# Patient Record
Sex: FEMALE | Race: WHITE | Employment: PART TIME | URBAN - METROPOLITAN AREA
[De-identification: names, ages, dates, MRNs, and addresses within clinical notes are randomized per-mention and may not be internally consistent; named-entity substitution may affect disease eponyms.]

---

## 2017-08-10 ENCOUNTER — OFFICE VISIT (OUTPATIENT)
Dept: INTERNAL MEDICINE CLINIC | Facility: CLINIC | Age: 52
End: 2017-08-10

## 2017-08-10 VITALS
BODY MASS INDEX: 45.08 KG/M2 | WEIGHT: 245 LBS | HEIGHT: 62 IN | DIASTOLIC BLOOD PRESSURE: 96 MMHG | OXYGEN SATURATION: 97 % | RESPIRATION RATE: 18 BRPM | SYSTOLIC BLOOD PRESSURE: 124 MMHG | HEART RATE: 79 BPM

## 2017-08-10 DIAGNOSIS — R79.89 LOW VITAMIN D LEVEL: ICD-10-CM

## 2017-08-10 DIAGNOSIS — Z12.31 VISIT FOR SCREENING MAMMOGRAM: ICD-10-CM

## 2017-08-10 DIAGNOSIS — I10 ESSENTIAL HYPERTENSION: ICD-10-CM

## 2017-08-10 DIAGNOSIS — Z00.00 ANNUAL PHYSICAL EXAM: Primary | ICD-10-CM

## 2017-08-10 DIAGNOSIS — Z71.6 TOBACCO ABUSE COUNSELING: ICD-10-CM

## 2017-08-10 DIAGNOSIS — G47.33 OBSTRUCTIVE SLEEP APNEA HYPOPNEA, SEVERE: ICD-10-CM

## 2017-08-10 DIAGNOSIS — E11.9 TYPE 2 DIABETES MELLITUS WITHOUT COMPLICATION, WITHOUT LONG-TERM CURRENT USE OF INSULIN (HCC): ICD-10-CM

## 2017-08-10 DIAGNOSIS — E78.1 HYPERTRIGLYCERIDEMIA: ICD-10-CM

## 2017-08-10 DIAGNOSIS — F32.89 OTHER DEPRESSION: ICD-10-CM

## 2017-08-10 DIAGNOSIS — E66.01 MORBID OBESITY WITH BMI OF 40.0-44.9, ADULT (HCC): ICD-10-CM

## 2017-08-10 DIAGNOSIS — E03.9 ACQUIRED HYPOTHYROIDISM: ICD-10-CM

## 2017-08-10 LAB
ALBUMIN SERPL BCP-MCNC: 4.5 G/DL (ref 3.5–4.8)
ALBUMIN/GLOB SERPL: 1.5 {RATIO} (ref 1–2)
ALP SERPL-CCNC: 61 U/L (ref 32–100)
ALT SERPL-CCNC: 45 U/L (ref 14–54)
ANION GAP SERPL CALC-SCNC: 12 MMOL/L (ref 0–18)
AST SERPL-CCNC: 41 U/L (ref 15–41)
BILIRUB SERPL-MCNC: 0.5 MG/DL (ref 0.3–1.2)
BUN SERPL-MCNC: 10 MG/DL (ref 8–20)
BUN/CREAT SERPL: 14.7 (ref 10–20)
CALCIUM SERPL-MCNC: 9.6 MG/DL (ref 8.5–10.5)
CHLORIDE SERPL-SCNC: 98 MMOL/L (ref 95–110)
CHOLEST SERPL-MCNC: 228 MG/DL (ref 110–200)
CO2 SERPL-SCNC: 28 MMOL/L (ref 22–32)
CREAT SERPL-MCNC: 0.68 MG/DL (ref 0.5–1.5)
GLOBULIN PLAS-MCNC: 3 G/DL (ref 2.5–3.7)
GLUCOSE SERPL-MCNC: 93 MG/DL (ref 70–99)
HDLC SERPL-MCNC: 43 MG/DL
LDLC SERPL CALC-MCNC: 135 MG/DL (ref 0–99)
NONHDLC SERPL-MCNC: 185 MG/DL
OSMOLALITY UR CALC.SUM OF ELEC: 285 MOSM/KG (ref 275–295)
POTASSIUM SERPL-SCNC: 4 MMOL/L (ref 3.3–5.1)
PROT SERPL-MCNC: 7.5 G/DL (ref 5.9–8.4)
SODIUM SERPL-SCNC: 138 MMOL/L (ref 136–144)
TRIGL SERPL-MCNC: 248 MG/DL (ref 1–149)
TSH SERPL-ACNC: 3.47 UIU/ML (ref 0.45–5.33)

## 2017-08-10 PROCEDURE — 99204 OFFICE O/P NEW MOD 45 MIN: CPT | Performed by: FAMILY MEDICINE

## 2017-08-10 PROCEDURE — 36415 COLL VENOUS BLD VENIPUNCTURE: CPT | Performed by: FAMILY MEDICINE

## 2017-08-10 PROCEDURE — 80061 LIPID PANEL: CPT | Performed by: FAMILY MEDICINE

## 2017-08-10 PROCEDURE — 83036 HEMOGLOBIN GLYCOSYLATED A1C: CPT | Performed by: FAMILY MEDICINE

## 2017-08-10 PROCEDURE — 84443 ASSAY THYROID STIM HORMONE: CPT | Performed by: FAMILY MEDICINE

## 2017-08-10 PROCEDURE — 80053 COMPREHEN METABOLIC PANEL: CPT | Performed by: FAMILY MEDICINE

## 2017-08-10 RX ORDER — HYDROCHLOROTHIAZIDE 50 MG/1
50 TABLET ORAL DAILY
COMMUNITY
End: 2017-08-10 | Stop reason: DRUGHIGH

## 2017-08-10 RX ORDER — BUPROPION HYDROCHLORIDE 150 MG/1
150 TABLET, EXTENDED RELEASE ORAL 2 TIMES DAILY
Qty: 180 TABLET | Refills: 0 | Status: SHIPPED | OUTPATIENT
Start: 2017-08-10 | End: 2017-12-05

## 2017-08-10 RX ORDER — ERGOCALCIFEROL 1.25 MG/1
50000 CAPSULE ORAL WEEKLY
COMMUNITY
End: 2017-08-10

## 2017-08-10 RX ORDER — LOSARTAN POTASSIUM 50 MG/1
50 TABLET ORAL DAILY
COMMUNITY
End: 2017-08-10 | Stop reason: DRUGHIGH

## 2017-08-10 RX ORDER — FENOFIBRATE 134 MG/1
134 CAPSULE ORAL
COMMUNITY
End: 2018-04-16

## 2017-08-10 RX ORDER — RIBOFLAVIN (VITAMIN B2) 100 MG
100 TABLET ORAL DAILY
COMMUNITY

## 2017-08-10 RX ORDER — ERGOCALCIFEROL 1.25 MG/1
50000 CAPSULE ORAL WEEKLY
Qty: 12 CAPSULE | Refills: 1 | Status: SHIPPED | OUTPATIENT
Start: 2017-08-10 | End: 2018-04-24

## 2017-08-10 RX ORDER — CHLORAL HYDRATE 500 MG
1000 CAPSULE ORAL DAILY
COMMUNITY

## 2017-08-10 RX ORDER — LEVOTHYROXINE SODIUM 0.05 MG/1
50 TABLET ORAL
COMMUNITY
End: 2017-09-13

## 2017-08-10 RX ORDER — METOPROLOL TARTRATE 50 MG/1
50 TABLET, FILM COATED ORAL 2 TIMES DAILY
COMMUNITY
End: 2018-04-16

## 2017-08-10 RX ORDER — LOSARTAN POTASSIUM AND HYDROCHLOROTHIAZIDE 25; 100 MG/1; MG/1
1 TABLET ORAL DAILY
Qty: 90 TABLET | Refills: 3 | Status: SHIPPED | OUTPATIENT
Start: 2017-08-10 | End: 2018-09-05

## 2017-08-10 RX ORDER — SIMVASTATIN 20 MG
20 TABLET ORAL NIGHTLY
COMMUNITY
End: 2017-08-11 | Stop reason: ALTCHOICE

## 2017-08-10 NOTE — PROGRESS NOTES
CC:  Physical (New pt presents to clinic for annual physical. )      Hx of CC:  BECOMING ESTABLISHED AND FOR GENERAL CHECK UP.     Vitals:    08/10/17  1302   BP: 124/96   Pulse: 79   Resp: 18   SpO2: 97%   Weight: 245 lb   Height: 62\"         Body mass in times daily. Dispense: 180 tablet; Refill: 0    9. Other depression    - BuPROPion HCl ER, SR, 150 MG Oral Tablet 12 Hr; Take 1 tablet (150 mg total) by mouth 2 (two) times daily. Dispense: 180 tablet; Refill: 0    10.  Low vitamin D level    - ergocalcif

## 2017-08-11 ENCOUNTER — MED REC SCAN ONLY (OUTPATIENT)
Dept: INTERNAL MEDICINE CLINIC | Facility: CLINIC | Age: 52
End: 2017-08-11

## 2017-08-11 DIAGNOSIS — E78.2 MIXED HYPERLIPIDEMIA: Primary | ICD-10-CM

## 2017-08-11 LAB — HBA1C MFR BLD: 6.2 % (ref 4–6)

## 2017-08-11 RX ORDER — ATORVASTATIN CALCIUM 20 MG/1
20 TABLET, FILM COATED ORAL NIGHTLY
Qty: 90 TABLET | Refills: 3 | Status: SHIPPED | OUTPATIENT
Start: 2017-08-11 | End: 2017-09-13

## 2017-09-13 DIAGNOSIS — E78.2 MIXED HYPERLIPIDEMIA: ICD-10-CM

## 2017-09-13 RX ORDER — ATORVASTATIN CALCIUM 20 MG/1
20 TABLET, FILM COATED ORAL NIGHTLY
Qty: 90 TABLET | Refills: 3 | Status: SHIPPED | OUTPATIENT
Start: 2017-09-13 | End: 2018-10-10

## 2017-09-13 RX ORDER — LEVOTHYROXINE SODIUM 0.05 MG/1
50 TABLET ORAL
Qty: 90 TABLET | Refills: 0 | Status: SHIPPED | OUTPATIENT
Start: 2017-09-13 | End: 2018-06-12

## 2017-09-29 ENCOUNTER — APPOINTMENT (OUTPATIENT)
Dept: GENERAL RADIOLOGY | Facility: HOSPITAL | Age: 52
DRG: 287 | End: 2017-09-29
Attending: EMERGENCY MEDICINE
Payer: MEDICAID

## 2017-09-29 ENCOUNTER — HOSPITAL ENCOUNTER (INPATIENT)
Facility: HOSPITAL | Age: 52
LOS: 3 days | Discharge: HOME | DRG: 287 | End: 2017-10-02
Attending: EMERGENCY MEDICINE | Admitting: HOSPITALIST
Payer: MEDICAID

## 2017-09-29 ENCOUNTER — HOSPITAL ENCOUNTER (OUTPATIENT)
Age: 52
Discharge: ANOTHER HOSPITAL | DRG: 287 | End: 2017-09-29
Attending: EMERGENCY MEDICINE
Payer: MEDICAID

## 2017-09-29 VITALS
HEIGHT: 62 IN | WEIGHT: 236 LBS | SYSTOLIC BLOOD PRESSURE: 138 MMHG | DIASTOLIC BLOOD PRESSURE: 86 MMHG | BODY MASS INDEX: 43.43 KG/M2 | TEMPERATURE: 98 F | HEART RATE: 59 BPM | OXYGEN SATURATION: 98 % | RESPIRATION RATE: 16 BRPM

## 2017-09-29 DIAGNOSIS — R07.9 ACUTE CHEST PAIN: Primary | ICD-10-CM

## 2017-09-29 PROCEDURE — 99223 1ST HOSP IP/OBS HIGH 75: CPT | Performed by: HOSPITALIST

## 2017-09-29 PROCEDURE — 93005 ELECTROCARDIOGRAM TRACING: CPT

## 2017-09-29 PROCEDURE — 93010 ELECTROCARDIOGRAM REPORT: CPT | Performed by: EMERGENCY MEDICINE

## 2017-09-29 PROCEDURE — 99214 OFFICE O/P EST MOD 30 MIN: CPT

## 2017-09-29 PROCEDURE — 93010 ELECTROCARDIOGRAM REPORT: CPT

## 2017-09-29 PROCEDURE — 71010 XR CHEST AP PORTABLE  (CPT=71010): CPT | Performed by: EMERGENCY MEDICINE

## 2017-09-29 RX ORDER — SODIUM PHOSPHATE, DIBASIC AND SODIUM PHOSPHATE, MONOBASIC 7; 19 G/133ML; G/133ML
1 ENEMA RECTAL ONCE AS NEEDED
Status: DISCONTINUED | OUTPATIENT
Start: 2017-09-29 | End: 2017-10-02

## 2017-09-29 RX ORDER — DEXTROSE MONOHYDRATE 25 G/50ML
50 INJECTION, SOLUTION INTRAVENOUS AS NEEDED
Status: DISCONTINUED | OUTPATIENT
Start: 2017-09-29 | End: 2017-10-02

## 2017-09-29 RX ORDER — ACETAMINOPHEN 325 MG/1
650 TABLET ORAL EVERY 6 HOURS PRN
Status: DISCONTINUED | OUTPATIENT
Start: 2017-09-29 | End: 2017-10-02

## 2017-09-29 RX ORDER — NITROGLYCERIN 0.4 MG/1
0.4 TABLET SUBLINGUAL EVERY 5 MIN PRN
Status: DISCONTINUED | OUTPATIENT
Start: 2017-09-29 | End: 2017-10-02

## 2017-09-29 RX ORDER — ASPIRIN 325 MG
325 TABLET ORAL DAILY
Status: DISCONTINUED | OUTPATIENT
Start: 2017-09-29 | End: 2017-10-01

## 2017-09-29 RX ORDER — MORPHINE SULFATE 2 MG/ML
2 INJECTION, SOLUTION INTRAMUSCULAR; INTRAVENOUS EVERY 2 HOUR PRN
Status: DISCONTINUED | OUTPATIENT
Start: 2017-09-29 | End: 2017-10-02

## 2017-09-29 RX ORDER — MORPHINE SULFATE 4 MG/ML
4 INJECTION, SOLUTION INTRAMUSCULAR; INTRAVENOUS EVERY 2 HOUR PRN
Status: DISCONTINUED | OUTPATIENT
Start: 2017-09-29 | End: 2017-10-02

## 2017-09-29 RX ORDER — NITROGLYCERIN 0.4 MG/1
TABLET SUBLINGUAL
Status: COMPLETED
Start: 2017-09-29 | End: 2017-09-29

## 2017-09-29 RX ORDER — MORPHINE SULFATE 2 MG/ML
1 INJECTION, SOLUTION INTRAMUSCULAR; INTRAVENOUS EVERY 2 HOUR PRN
Status: DISCONTINUED | OUTPATIENT
Start: 2017-09-29 | End: 2017-10-02

## 2017-09-29 RX ORDER — NITROGLYCERIN 0.4 MG/1
0.4 TABLET SUBLINGUAL ONCE
Status: COMPLETED | OUTPATIENT
Start: 2017-09-29 | End: 2017-09-29

## 2017-09-29 RX ORDER — ATORVASTATIN CALCIUM 40 MG/1
80 TABLET, FILM COATED ORAL NIGHTLY
Status: DISCONTINUED | OUTPATIENT
Start: 2017-09-29 | End: 2017-10-02

## 2017-09-29 RX ORDER — NITROGLYCERIN 0.4 MG/1
0.4 TABLET SUBLINGUAL EVERY 5 MIN PRN
Status: DISCONTINUED | OUTPATIENT
Start: 2017-09-29 | End: 2017-09-30

## 2017-09-29 RX ORDER — BISACODYL 10 MG
10 SUPPOSITORY, RECTAL RECTAL
Status: DISCONTINUED | OUTPATIENT
Start: 2017-09-29 | End: 2017-10-02

## 2017-09-29 RX ORDER — ENOXAPARIN SODIUM 100 MG/ML
40 INJECTION SUBCUTANEOUS DAILY
Status: DISCONTINUED | OUTPATIENT
Start: 2017-09-29 | End: 2017-10-02

## 2017-09-29 RX ORDER — ONDANSETRON 2 MG/ML
4 INJECTION INTRAMUSCULAR; INTRAVENOUS EVERY 6 HOURS PRN
Status: DISCONTINUED | OUTPATIENT
Start: 2017-09-29 | End: 2017-10-02

## 2017-09-29 RX ORDER — DOCUSATE SODIUM 100 MG/1
100 CAPSULE, LIQUID FILLED ORAL 2 TIMES DAILY
Status: DISCONTINUED | OUTPATIENT
Start: 2017-09-29 | End: 2017-10-02

## 2017-09-29 RX ORDER — POLYETHYLENE GLYCOL 3350 17 G/17G
17 POWDER, FOR SOLUTION ORAL DAILY PRN
Status: DISCONTINUED | OUTPATIENT
Start: 2017-09-29 | End: 2017-10-02

## 2017-09-29 RX ORDER — ASPIRIN 81 MG/1
324 TABLET, CHEWABLE ORAL ONCE
Status: COMPLETED | OUTPATIENT
Start: 2017-09-29 | End: 2017-09-29

## 2017-09-29 RX ORDER — METOPROLOL SUCCINATE 25 MG/1
25 TABLET, EXTENDED RELEASE ORAL
Status: DISCONTINUED | OUTPATIENT
Start: 2017-09-30 | End: 2017-10-02

## 2017-09-29 RX ORDER — SODIUM CHLORIDE 0.9 % (FLUSH) 0.9 %
3 SYRINGE (ML) INJECTION AS NEEDED
Status: DISCONTINUED | OUTPATIENT
Start: 2017-09-29 | End: 2017-10-02

## 2017-09-29 NOTE — ED PROVIDER NOTES
Patient Seen in: [facility] Immediate Care In [address] [place] [address]    History   Patient presents with:  Numbness Weakness (neurologic)    Stated Complaint: Arm Numbness/Leg Numbness    HPI    50-year-old female with history of tobacco abuse, morbid obesity, h 157.5 cm (5' 2\")   Wt 107 kg   LMP 08/10/2015   SpO2 98%   BMI 43.16 kg/m²         Physical Exam   Constitutional: She is oriented to person, place, and time. She appears well-developed and well-nourished. No distress.    HENT:   Head: Normocephalic and at

## 2017-09-29 NOTE — H&P
[facility]    PATIENT'S NAME: PRASHANTH EARLDaja TERRY [facility], [address] PHYSICIAN: Yelitza Higginbotham MD   PATIENT ACCOUNT#:   [id number]    LOCATION:  [place] [address]  MEDICAL RECORD #:   [id number]       YOB: 1965  ADMISSION DATE:       09/29/20 PHYSICAL EXAMINATION:    GENERAL:  Alert. Oriented to time, place and person. No acute distress. VITAL SIGNS:  Temperature 98.0, pulse 57, respiratory rate 19, blood pressure 122/71, pulse ox 96% on room air. HEENT:  Atraumatic.   Oropharynx clear w

## 2017-09-29 NOTE — ED INITIAL ASSESSMENT (HPI)
Pt sent from [facility] after EKG, pt had numbness to upper arms and both lower legs this morning, fatigue and slight headache denies NVD, had right sided chest pain and right ear pressure and head

## 2017-09-29 NOTE — ED PROVIDER NOTES
Patient Seen in: [facility] Emergency Department    History   Patient presents with:  Chest Pain Angina (cardiovascular)    Stated Complaint: chest pain, arm numbness    HPI    Patient presents with discomfort in the right side of the chest starti Hypertension Father        Smoking status: Current Every Day Smoker                                                   Packs/day: 0.50      Years: 0.00      Smokeless tobacco: Never Used                      Alcohol use:  Yes               Review of Systems well.    I recommended admission we will do blood tests EKG chest x-ray do trial of sublingual nitroglycerin    She did feel better with nitroglycerin still having some discomfort workup to this point is negative.   She does have Q waves on EKG with her his follow-up provider specified.     Medications Prescribed:  Current Discharge Medication List        Present on Admission           ICD-10-CM Noted POA    Acute chest pain R07.9 9/29/2017 Unknown

## 2017-09-29 NOTE — ED INITIAL ASSESSMENT (HPI)
Presents with bilateral arm and leg numbness and numbness to right side of chest. Also reports feeling of \"ears full\", chest pain, and SOB. Symptoms began this morning while sitting at table having breakfast. Also reports bilateral weakness.  Pt ambulated

## 2017-09-29 NOTE — PLAN OF CARE
Diabetes/Glucose Control    • Glucose maintained within prescribed range Progressing    Glucose within prescribed range     Patient/Family Goals    • Patient/Family Long Term Goal Progressing    • Patient/Family Short Term Goal Progressing    Discussed

## 2017-09-30 ENCOUNTER — APPOINTMENT (OUTPATIENT)
Dept: ULTRASOUND IMAGING | Facility: HOSPITAL | Age: 52
DRG: 287 | End: 2017-09-30
Attending: HOSPITALIST
Payer: MEDICAID

## 2017-09-30 ENCOUNTER — APPOINTMENT (OUTPATIENT)
Dept: CV DIAGNOSTICS | Facility: HOSPITAL | Age: 52
DRG: 287 | End: 2017-09-30
Attending: HOSPITALIST
Payer: MEDICAID

## 2017-09-30 PROCEDURE — 93306 TTE W/DOPPLER COMPLETE: CPT | Performed by: HOSPITALIST

## 2017-09-30 PROCEDURE — 99233 SBSQ HOSP IP/OBS HIGH 50: CPT | Performed by: HOSPITALIST

## 2017-09-30 PROCEDURE — 93971 EXTREMITY STUDY: CPT | Performed by: HOSPITALIST

## 2017-09-30 RX ORDER — ISOSORBIDE MONONITRATE 30 MG/1
30 TABLET, EXTENDED RELEASE ORAL DAILY
Status: DISCONTINUED | OUTPATIENT
Start: 2017-09-30 | End: 2017-10-02

## 2017-09-30 RX ORDER — GABAPENTIN 100 MG/1
100 CAPSULE ORAL NIGHTLY
Status: DISCONTINUED | OUTPATIENT
Start: 2017-09-30 | End: 2017-10-02

## 2017-09-30 NOTE — PROGRESS NOTES
[place] [facility]  Hospitalist Progress  Note     Radha Quiñonez Patient Status:  Inpatient    1965  47year old CSN [id number]   Location 338/338-A Attending Reynold Farah, [address] [facility] Day # 1 PCP Babita Fong, DO     ASSESSMENT/PLAN    Yadira Macdonald 4.3   CL  101  102   CO2  25  30     No results for input(s): PT, INR, PTT in the last 72 hours.     • Isosorbide Mononitrate ER  30 mg Oral Daily   • enoxaparin  40 mg Subcutaneous Daily   • docusate sodium  100 mg Oral BID   • Insulin Aspart Pen  1-7 Unit

## 2017-09-30 NOTE — PROGRESS NOTES
Patient seen in follow up. No overnight events.    Still feels slight bilateral arm numbness but chest heaviness has improved with nitro paste   Vitals reviewed and blood pressure stable   Troponin negative x2        09/30/17  0845   BP: 108/66   Puls Glucose-Vitamin C (DEX-4) 4-0.006 g chewable tab 4 tablet 4 tablet Oral Q15 Min PRN   Insulin Aspart Pen (NOVOLOG) 100 UNIT/ML flexpen 1-7 Units 1-7 Units Subcutaneous TID CC   aspirin tab 325 mg 325 mg Oral Daily   nitroGLYCERIN (NITROSTAT) SL tab 0.4 mg 1. Acute chest pain likely consistent with angina   - ECG normal sinus rhythm, inferior infarct   - Tropoinin negative x2. - On asa, BB, statin, nitro paste. Feeling better     2. HTN   3. Dyslipidemia   4. DM II  5. Tobacco user      Plan   1.  Started as

## 2017-10-01 ENCOUNTER — APPOINTMENT (OUTPATIENT)
Dept: GENERAL RADIOLOGY | Facility: HOSPITAL | Age: 52
DRG: 287 | End: 2017-10-01
Attending: HOSPITALIST
Payer: MEDICAID

## 2017-10-01 PROCEDURE — 73502 X-RAY EXAM HIP UNI 2-3 VIEWS: CPT | Performed by: HOSPITALIST

## 2017-10-01 PROCEDURE — 99233 SBSQ HOSP IP/OBS HIGH 50: CPT | Performed by: HOSPITALIST

## 2017-10-01 RX ORDER — SODIUM CHLORIDE 9 MG/ML
INJECTION, SOLUTION INTRAVENOUS CONTINUOUS
Status: DISCONTINUED | OUTPATIENT
Start: 2017-10-01 | End: 2017-10-01

## 2017-10-01 RX ORDER — MORPHINE SULFATE 2 MG/ML
1 INJECTION, SOLUTION INTRAMUSCULAR; INTRAVENOUS EVERY 4 HOURS PRN
Status: DISCONTINUED | OUTPATIENT
Start: 2017-10-01 | End: 2017-10-02

## 2017-10-01 RX ORDER — ASPIRIN 81 MG/1
324 TABLET, CHEWABLE ORAL ONCE
Status: COMPLETED | OUTPATIENT
Start: 2017-10-02 | End: 2017-10-02

## 2017-10-01 RX ORDER — MORPHINE SULFATE 2 MG/ML
1 INJECTION, SOLUTION INTRAMUSCULAR; INTRAVENOUS ONCE
Status: COMPLETED | OUTPATIENT
Start: 2017-10-01 | End: 2017-10-01

## 2017-10-01 RX ORDER — SODIUM CHLORIDE 9 MG/ML
INJECTION, SOLUTION INTRAVENOUS CONTINUOUS
Status: DISCONTINUED | OUTPATIENT
Start: 2017-10-02 | End: 2017-10-02

## 2017-10-01 RX ORDER — ASPIRIN 325 MG
325 TABLET ORAL DAILY
Status: DISCONTINUED | OUTPATIENT
Start: 2017-10-03 | End: 2017-10-02

## 2017-10-01 NOTE — PROGRESS NOTES
Pt complained of left lower extremity pain, notified Caitlin Kelly ordered venous doppler US, radiologist  called with the result, he said pt had a negative lower extremity.

## 2017-10-01 NOTE — PROGRESS NOTES
[place] [facility]  Hospitalist Progress  Note     [facility] Patient Status:  Inpatient    1965  47year old CSN [id number]   Location 338/338-A Attending Diann Dixon, [address] [facility] [address] Day # 2 PCP Girish Gustafson DO     ASSESSMENT/PLAN    [address] erythema, diaphoresis. Psych: Normal mood and affect. Behavior and judgment normal.     Labs:  Recent Labs   Lab  09/29/17   1343  09/30/17   0647   RBC  4.64  4.42   HGB  12.9  12.4   HCT  38.8  37.2   MCV  83.5  84.1   MCH  27.7  28.0   MCHC  33.2  33.

## 2017-10-01 NOTE — BH PROGRESS NOTE
S/O  Patient c/o severe pain radiating from L upper thigh to knee. It comes and goes. She has never had any similar pain in the past.  Tried gabapentin for the possibility of neuropathy.   Checked lower extremity venous doppler which was reportedly negati

## 2017-10-01 NOTE — PROGRESS NOTES
Patient seen in follow up. She had some left hip pain that radiated down her leg.  She had xray showing some degenerative spine changes that might have caused her pain   Vitals reviewed and blood pressure stable   Labs reviewed      10/01/17  0802 FLEET ENEMA (FLEET) 7-19 GM/118ML enema 133 mL 1 enema Rectal Once PRN   dextrose 50% injection 50 mL 50 mL Intravenous PRN   Glucose-Vitamin C (DEX-4) 4-0.006 g chewable tab 4 tablet 4 tablet Oral Q15 Min PRN   Insulin Aspart Pen (NOVOLOG) 100 UNIT/ML fle CONCLUSION: No acute disease. No significant characteristic degenerative changes at the hip. Bony pelvis intact.     Degenerative changes at the lower lumbar spine may reflect chronic degenerative disc disease with referred pain to the left groin and hip

## 2017-10-02 ENCOUNTER — APPOINTMENT (OUTPATIENT)
Dept: INTERVENTIONAL RADIOLOGY/VASCULAR | Facility: HOSPITAL | Age: 52
DRG: 287 | End: 2017-10-02
Attending: INTERNAL MEDICINE
Payer: MEDICAID

## 2017-10-02 ENCOUNTER — TELEPHONE (OUTPATIENT)
Dept: INTERNAL MEDICINE CLINIC | Facility: CLINIC | Age: 52
End: 2017-10-02

## 2017-10-02 VITALS
DIASTOLIC BLOOD PRESSURE: 66 MMHG | BODY MASS INDEX: 46.17 KG/M2 | TEMPERATURE: 98 F | OXYGEN SATURATION: 98 % | WEIGHT: 250.88 LBS | SYSTOLIC BLOOD PRESSURE: 126 MMHG | HEIGHT: 62 IN | HEART RATE: 57 BPM | RESPIRATION RATE: 18 BRPM

## 2017-10-02 PROCEDURE — B2111ZZ FLUOROSCOPY OF MULTIPLE CORONARY ARTERIES USING LOW OSMOLAR CONTRAST: ICD-10-PCS | Performed by: INTERNAL MEDICINE

## 2017-10-02 PROCEDURE — 99239 HOSP IP/OBS DSCHRG MGMT >30: CPT | Performed by: HOSPITALIST

## 2017-10-02 PROCEDURE — 4A023N7 MEASUREMENT OF CARDIAC SAMPLING AND PRESSURE, LEFT HEART, PERCUTANEOUS APPROACH: ICD-10-PCS | Performed by: INTERNAL MEDICINE

## 2017-10-02 RX ORDER — MIDAZOLAM HYDROCHLORIDE 1 MG/ML
INJECTION INTRAMUSCULAR; INTRAVENOUS
Status: COMPLETED
Start: 2017-10-02 | End: 2017-10-02

## 2017-10-02 RX ORDER — HEPARIN SODIUM 1000 [USP'U]/ML
INJECTION, SOLUTION INTRAVENOUS; SUBCUTANEOUS
Status: COMPLETED
Start: 2017-10-02 | End: 2017-10-02

## 2017-10-02 RX ORDER — SODIUM CHLORIDE 9 MG/ML
INJECTION, SOLUTION INTRAVENOUS CONTINUOUS
Status: DISCONTINUED | OUTPATIENT
Start: 2017-10-02 | End: 2017-10-02

## 2017-10-02 RX ORDER — LIDOCAINE HYDROCHLORIDE 20 MG/ML
INJECTION, SOLUTION EPIDURAL; INFILTRATION; INTRACAUDAL; PERINEURAL
Status: COMPLETED
Start: 2017-10-02 | End: 2017-10-02

## 2017-10-02 RX ORDER — VERAPAMIL HYDROCHLORIDE 2.5 MG/ML
INJECTION, SOLUTION INTRAVENOUS
Status: DISCONTINUED
Start: 2017-10-02 | End: 2017-10-02 | Stop reason: WASHOUT

## 2017-10-02 RX ORDER — ENOXAPARIN SODIUM 100 MG/ML
30 INJECTION SUBCUTANEOUS EVERY 12 HOURS SCHEDULED
Status: DISCONTINUED | OUTPATIENT
Start: 2017-10-02 | End: 2017-10-02

## 2017-10-02 RX ORDER — NITROGLYCERIN 20 MG/100ML
INJECTION INTRAVENOUS
Status: COMPLETED
Start: 2017-10-02 | End: 2017-10-02

## 2017-10-02 NOTE — OPERATIVE REPORT
[facility]    PATIENT'S NAME: PRASHANTH STEWART [facility]   ATTENDING PHYSICIAN: Rex Maritnes MD   OPERATING PHYSICIAN: Nicki Severin, MD   PATIENT ACCOUNT#:   [id number]    LOCATION:  [address] [place] [address] RECORD #:   [id number]       DATE OF BIRTH:  [birth year] cusp in the usual fashion, it is a dominant artery, and showed mild diffuse disease. IMPRESSION:  Mild coronary artery disease, as described above. RECOMMENDATION:  Patient to maximize medical therapy for coronary artery disease.     Dictated By Tania Vidales

## 2017-10-02 NOTE — RESPIRATORY THERAPY NOTE
RESPIRATORY THERAPY CPAP PROGRESS NOTE:    Patient is compliant with nightly CPAP: YES  Patient refused: NO  AutoCPAP in use: YES  Peak EPAP noted:  [id number]  CPAP interface: NASAL PILLOWS  Patient tolerating: WELL    RCP recommends:  CONTINUE TO MONITOR TH

## 2017-10-02 NOTE — PROGRESS NOTES
Patient seen in follow up.    Vitals reviewed   Labs reviewed      10/02/17  0984   BP:    Pulse: 56   Resp: 18   Temp:        Intake/Output Summary (Last 24 hours) at 10/02/17 1139  Last data filed at 10/02/17 0736   Gross per 24 hour   Intake dextrose 50% injection 50 mL 50 mL Intravenous PRN   Glucose-Vitamin C (DEX-4) 4-0.006 g chewable tab 4 tablet 4 tablet Oral Q15 Min PRN   Insulin Aspart Pen (NOVOLOG) 100 UNIT/ML flexpen 1-7 Units 1-7 Units Subcutaneous TID CC   nitroGLYCERIN (NITROSTAT) - ECG normal sinus rhythm, inferior infarct   - Tropoinin negative x4   - Echo EF 65%  -  Cath showed mild CAD disease  - On asa, BB, statin, nitrates      2. HTN   3. Dyslipidemia   4. DM II  5. Tobacco user      Plan   1.  Continue ASA, BB, statin, and st

## 2017-10-02 NOTE — PLAN OF CARE
CARDIOVASCULAR - ADULT    • Maintains optimal cardiac output and hemodynamic stability Completed    • Absence of cardiac arrhythmias or at baseline Completed          Diabetes/Glucose Control    • Glucose maintained within prescribed range Completed

## 2017-10-02 NOTE — DISCHARGE SUMMARY
[facility] HOSPITALIST  DISCHARGE SUMMARY     Rodriguez Nicolas Patient Status:  Inpatient    1965 MRN [id number]   Location [facility] 3W/SW Attending Preeti Cobos, [address] [facility] [address] Day # 3 PCP Dominga Chandler,      DATE OF ADMISSION: 2017  DATE OF primary care physician whether imaging of her neck is appropriate. PHYSICAL EXAM:  Temp:  [97.9 °F (36.6 °C)-98.4 °F (36.9 °C)] 97.9 °F (36.6 °C)  Pulse:  [50-68] 57  Resp:  [15-20] 18  BP: (105-147)/(54-87) 126/66  Gen: A+Ox3. No distress.    HEENT: NC HYZAAR      Take 1 tablet by mouth daily. Quantity:  90 tablet  Refills:  3     MetFORMIN HCl 500 MG Tabs  Commonly known as:  GLUCOPHAGE      Take 500 mg by mouth 2 (two) times daily with meals.    Refills:  0     Metoprolol Tartrate 50 MG Tabs  Commonly

## 2017-10-02 NOTE — PROGRESS NOTES
Renata Fillers was prescribed enoxaparin (Lovenox) 40 mg subcutaneously every 24 hours. BMI: Body mass index is 45.89 kg/m². Estimated Creatinine Clearance: 72.5 mL/min (based on SCr of 0.71 mg/dL).     Per protocol, based on these patient-specific fa

## 2017-10-02 NOTE — BRIEF PROCEDURE NOTE
Preop diagnosis: Angina  Post op diagnosis: Mild CAD  Procedures: LH, COR  Findings: Mild CAD, LVEDP 29  EBL: 20 mls  Specimens: None    Juan C Caballero MD

## 2017-10-03 ENCOUNTER — TELEPHONE (OUTPATIENT)
Dept: MEDSURG UNIT | Facility: HOSPITAL | Age: 52
End: 2017-10-03

## 2017-10-03 DIAGNOSIS — Z09 HOSPITAL DISCHARGE FOLLOW-UP: Primary | ICD-10-CM

## 2017-10-03 NOTE — PAYOR COMM NOTE
--------------  ADMISSION REVIEW     Payor: Jose Shepherd #:  [id number]  Authorization Number: N/A    Admit date: 9/29/17  Admit time: 26       Admitting Physician: Preethi Snider MD  Attending Physician:  No at fenofibrate micronized 134 MG Oral Cap,  Take 134 mg by mouth daily with breakfast.   Metoprolol Tartrate 50 MG Oral Tab,  Take 50 mg by mouth 2 (two) times daily. MetFORMIN HCl 500 MG Oral Tab,  Take 500 mg by mouth 2 (two) times daily with meals.    om Constitutional:  Alert, well nourished adult lying in bed in no distress. Vital signs noted. Eye:  No scleral icterus. Eyelids appear normal, no lesions. Cardiovascular:  Normal S1 and S2, no murmur, regular, with good peripheral perfusion.   Patient ha CBC W/ DIFFERENTIAL[[id number]]          Normal              Final result                 Please view results for these tests on the individual orders.    RAINBOW DRAW BLUE   RAINBOW DRAW LAVENDER   RAINBOW DRAW DARK GREEN   RAINBOW DRAW LIGHT GREEN   Carmen. Vidhya Abraham 135 ADMISSION DATE:       09/29/2017    HISTORY AND PHYSICAL EXAMINATION    CHIEF COMPLAINT:  Angina pectoris.     HISTORY OF PRESENT ILLNESS:  The patient is a 43-year-old  female who started having bilateral upper extremity dull sensation and pressur HEENT:  Atraumatic. Oropharynx clear with moist mucous membranes. Normal hard and soft palate. NECK:  Trachea midline. Full range of motion. Supple. No thyromegaly or lymphadenopathy. LUNGS:  Clear to auscultation bilaterally.   Normal respiratory ef Author: Florecita Stalh DO Service: (none) Author Type: Physician   Filed: 9/30/2017 11:52 AM Date of Service: 9/29/2017  4:12 PM Status: Signed   : Florecita Stahl DO (Physician)   []Manual[]Template  []Copied       Patient is a 47year old fema All other systems reviewed and negative.     Physical Exam:         General: No acute distress. HEENT: No scleral icterus. No xanthelasma. Neck: No JVD, no bruits. Cardiac: Regular rate and rhythm.  S1, S2 normal.   Lungs: Clear without rhales, rhochi o  1965  47year old CSN [id number]   Location 338/338-A Attending Preeti Cobos MD   Hosp Day # 1 PCP Dominga Chandler, DO      ASSESSMENT/PLAN     Unstable angina. Troponins are negative. Still occasional discomfort.   Cardiology consultation appreciate No results for input(s): PT, INR, PTT in the last 72 hours.     • Isosorbide Mononitrate ER  30 mg Oral Daily   • enoxaparin  40 mg Subcutaneous Daily   • docusate sodium  100 mg Oral BID   • Insulin Aspart Pen  1-7 Units Subcutaneous TID CC   • aspirin  3 Left thigh pain. Occurred on the evening of 9/30/17. Located mostly on the left inner thigh radiating past the knee. Does not originate in the hip or the back.   No previous issues with this in the past.  Patient feels that she may have been laying aroun GFRNAA  >60  >60   CA  9.7  9.1   NA  137  137   K  3.6  4.3   CL  101  102   CO2  25  30      No results for input(s): PT, INR, PTT in the last 72 hours.     • Isosorbide Mononitrate ER  30 mg Oral Daily   • gabapentin  100 mg Oral Nightly   • enoxaparin Abdomen: Soft. Extremities: No edema. Neurological: Alert.   Psychiatric: Appropriate mood and affect.     Current Facility-Administered Medications:  morphINE sulfate (PF) 2 MG/ML injection 1 mg 1 mg Intravenous Q4H PRN   [START ON 10/3/2017] aspirin tab Levothyroxine Sodium 50 MCG Oral Tab Take 1 tablet (50 mcg total) by mouth before breakfast.   fenofibrate micronized 134 MG Oral Cap Take 134 mg by mouth daily with breakfast.   Metoprolol Tartrate 50 MG Oral Tab Take 50 mg by mouth 2 (two) times daily. Description: 47year old F Primary Service: Cardiac Telemetry Unit Info: [address] [place] [address] 3-W/SW   Discharge Summary Notes     Discharge Summaries signed by Reynold Farah MD at 10/2/2017  2:55 PM     Author: Reynold Farah MD Service: Hospitalist Author Type: Denice Dill Patient monitored in telemetry. Cardiology was consulted. Cardiac catheterization was recommended. Troponins remain negative. No further chest pain. Cardiac catheterization showed nonobstructive coronary disease that was mild.   Patient will be continu Take 1 tablet (20 mg total) by mouth nightly. Quantity:  90 tablet  Refills:  3   BuPROPion HCl ER (SR) 150 MG Tb12  Commonly known as:  WELLBUTRIN SR    Take 1 tablet (150 mg total) by mouth 2 (two) times daily.  Quantity:  180 tablet  Refills:  0   ergo The above plan and follow-up instructions were reviewed with the patient and they verbalized understanding and agreement. They understand to return to the emergency room for any concerning signs or symptoms. Greater than 30 minutes spent on discharge.

## 2017-10-16 ENCOUNTER — OFFICE VISIT (OUTPATIENT)
Dept: INTERNAL MEDICINE CLINIC | Facility: CLINIC | Age: 52
End: 2017-10-16

## 2017-10-16 VITALS
DIASTOLIC BLOOD PRESSURE: 90 MMHG | BODY MASS INDEX: 45.08 KG/M2 | WEIGHT: 245 LBS | SYSTOLIC BLOOD PRESSURE: 132 MMHG | RESPIRATION RATE: 19 BRPM | OXYGEN SATURATION: 98 % | HEIGHT: 62 IN | HEART RATE: 64 BPM

## 2017-10-16 DIAGNOSIS — E11.9 TYPE 2 DIABETES MELLITUS WITHOUT COMPLICATION, WITHOUT LONG-TERM CURRENT USE OF INSULIN (HCC): ICD-10-CM

## 2017-10-16 DIAGNOSIS — Z71.6 TOBACCO ABUSE COUNSELING: ICD-10-CM

## 2017-10-16 DIAGNOSIS — Z23 NEED FOR INFLUENZA VACCINATION: ICD-10-CM

## 2017-10-16 DIAGNOSIS — E66.01 MORBID OBESITY WITH BMI OF 40.0-44.9, ADULT (HCC): Primary | ICD-10-CM

## 2017-10-16 DIAGNOSIS — M17.11 ARTHRITIS OF RIGHT KNEE: ICD-10-CM

## 2017-10-16 DIAGNOSIS — M47.816 OSTEOARTHRITIS OF LUMBAR SPINE, UNSPECIFIED SPINAL OSTEOARTHRITIS COMPLICATION STATUS: ICD-10-CM

## 2017-10-16 DIAGNOSIS — Z09 HOSPITAL DISCHARGE FOLLOW-UP: ICD-10-CM

## 2017-10-16 PROCEDURE — 90471 IMMUNIZATION ADMIN: CPT | Performed by: FAMILY MEDICINE

## 2017-10-16 PROCEDURE — 90686 IIV4 VACC NO PRSV 0.5 ML IM: CPT | Performed by: FAMILY MEDICINE

## 2017-10-16 PROCEDURE — 99214 OFFICE O/P EST MOD 30 MIN: CPT | Performed by: FAMILY MEDICINE

## 2017-10-17 NOTE — PROGRESS NOTES
CC:  Hospital F/U (Pt presents to clinic for f/up hospital stay for chest pain and arm numbness. All testing normal.)      Hx of CC:  POST HOSPITALIZATION FOR CHEST PAIN--VARIOUS STUDIES \"NORMAL\". PATIENT C/O GRADUALLY WORSENING LOW BACK AND KNEE PAINS. Hospital discharge follow-up  REVIEWED ALL HOSPITAL WORKUP    FLULAVAL INFLUENZA VACCINE QUAD PRESERVATIVE FREE 0.5 ML  BARIATRICS - INTERNAL  No orders of the defined types were placed in this encounter.

## 2017-10-24 ENCOUNTER — TELEPHONE (OUTPATIENT)
Dept: INTERNAL MEDICINE CLINIC | Facility: CLINIC | Age: 52
End: 2017-10-24

## 2017-10-27 ENCOUNTER — OFFICE VISIT (OUTPATIENT)
Dept: INTERNAL MEDICINE CLINIC | Facility: CLINIC | Age: 52
End: 2017-10-27

## 2017-10-27 VITALS
HEART RATE: 74 BPM | HEIGHT: 62 IN | TEMPERATURE: 99 F | SYSTOLIC BLOOD PRESSURE: 126 MMHG | DIASTOLIC BLOOD PRESSURE: 84 MMHG | OXYGEN SATURATION: 98 % | BODY MASS INDEX: 46.52 KG/M2 | WEIGHT: 252.81 LBS

## 2017-10-27 DIAGNOSIS — E78.1 HYPERTRIGLYCERIDEMIA: ICD-10-CM

## 2017-10-27 DIAGNOSIS — E66.01 MORBID OBESITY (HCC): ICD-10-CM

## 2017-10-27 DIAGNOSIS — I10 ESSENTIAL HYPERTENSION: ICD-10-CM

## 2017-10-27 DIAGNOSIS — E11.9 TYPE 2 DIABETES MELLITUS WITHOUT COMPLICATION, WITHOUT LONG-TERM CURRENT USE OF INSULIN (HCC): Primary | ICD-10-CM

## 2017-10-27 PROCEDURE — 99214 OFFICE O/P EST MOD 30 MIN: CPT | Performed by: INTERNAL MEDICINE

## 2017-10-27 NOTE — PATIENT INSTRUCTIONS
TIFFANY    Welcome to [facility] group weight loss clinic. We are excited that you are committed to improving your health and have invited our practice to be part of your journey.  Our approach to the medical management of weight loss is isamar water per day, add fiber ( benefiber) to the water to increase fullness, overcome constipation  - portion control   - Eat slowly and take 20 to 30 minutes to complete each meal  - Do not drink your calories ( no regular pop, juice, high calorie coffee drin health!     1.___________________________________________________________    2.___________________________________________________________    3.___________________________________________________________    4.________________________________________________ pounds by this time next year!

## 2017-10-27 NOTE — PROGRESS NOTES
Cat Anderson is a 47year old female.     Chief complaint:weight loss management and obesity related comorbidities  HPI:   With PMH as listed below here for medical weight loss management   Has been struggling with this problem since adulthood   On and of fenofibrate micronized 134 MG Oral Cap Take 134 mg by mouth daily with breakfast. Disp:  Rfl:    Metoprolol Tartrate 50 MG Oral Tab Take 50 mg by mouth 2 (two) times daily.  Disp:  Rfl:    MetFORMIN HCl 500 MG Oral Tab Take 500 mg by mouth 2 (two) times d °C)   Ht 62\"   Wt 252 lb 12.8 oz   LMP 08/10/2015   SpO2 98%   BMI 46.24 kg/m²   [unfilled]  Wt Readings from Last 6 Encounters:  10/27/17 : 252 lb 12.8 oz  10/16/17 : 245 lb  10/02/17 : 250 lb 14.4 oz  09/29/17 : 236 lb  08/10/17 : 245 lb    GENERAL: Sleeping better with cpap machine     HTN:  On metoprolol and hctz losartan  If ok with pcp recommend switching metoprolol to amlodipine ( metoprolol can cause weight gain )    Follow up with pcp as scheduled   Follow up with us in 1 month  Please return

## 2017-11-07 ENCOUNTER — OFFICE VISIT (OUTPATIENT)
Dept: PULMONOLOGY | Facility: CLINIC | Age: 52
End: 2017-11-07

## 2017-11-07 ENCOUNTER — HOSPITAL ENCOUNTER (OUTPATIENT)
Dept: MAMMOGRAPHY | Facility: HOSPITAL | Age: 52
Discharge: HOME | End: 2017-11-07
Attending: FAMILY MEDICINE
Payer: MEDICAID

## 2017-11-07 VITALS
HEIGHT: 62 IN | BODY MASS INDEX: 46.56 KG/M2 | HEART RATE: 74 BPM | RESPIRATION RATE: 18 BRPM | SYSTOLIC BLOOD PRESSURE: 116 MMHG | WEIGHT: 253 LBS | OXYGEN SATURATION: 97 % | DIASTOLIC BLOOD PRESSURE: 73 MMHG

## 2017-11-07 DIAGNOSIS — G47.33 OBSTRUCTIVE SLEEP APNEA HYPOPNEA, SEVERE: Primary | ICD-10-CM

## 2017-11-07 DIAGNOSIS — Z12.31 VISIT FOR SCREENING MAMMOGRAM: ICD-10-CM

## 2017-11-07 PROCEDURE — 99244 OFF/OP CNSLTJ NEW/EST MOD 40: CPT | Performed by: INTERNAL MEDICINE

## 2017-11-07 PROCEDURE — 77067 SCR MAMMO BI INCL CAD: CPT | Performed by: FAMILY MEDICINE

## 2017-11-07 PROCEDURE — 99212 OFFICE O/P EST SF 10 MIN: CPT | Performed by: INTERNAL MEDICINE

## 2017-11-07 NOTE — PROGRESS NOTES
Dear  Real Leyden :           As you know, Bailee Rivers is a 39-year-old female who I am now evaluating for sleep disordered breathing.     HISTORY OF PRESENT ILLNESS: The patient has a history of profound daytime somnolence with unrefreshing sleep, snoring, witnessed accommodation. Neck without adenopathy, thyromegaly, JVD nor bruit. Lungs clear to auscultation and percussion. Cardiac regular rate and rhythm no murmur. Abdomen nontender, without hepatosplenomegaly and no mass appreciable.  Extremities without clubbing c

## 2017-11-29 ENCOUNTER — TELEPHONE (OUTPATIENT)
Dept: INTERNAL MEDICINE CLINIC | Facility: CLINIC | Age: 52
End: 2017-11-29

## 2017-12-05 DIAGNOSIS — Z71.6 TOBACCO ABUSE COUNSELING: ICD-10-CM

## 2017-12-05 DIAGNOSIS — E66.01 MORBID OBESITY WITH BMI OF 40.0-44.9, ADULT (HCC): ICD-10-CM

## 2017-12-05 DIAGNOSIS — F32.89 OTHER DEPRESSION: ICD-10-CM

## 2017-12-05 RX ORDER — BUPROPION HYDROCHLORIDE 150 MG/1
TABLET, EXTENDED RELEASE ORAL
Qty: 180 TABLET | Refills: 1 | Status: SHIPPED | OUTPATIENT
Start: 2017-12-05 | End: 2018-02-06

## 2018-02-06 DIAGNOSIS — F32.89 OTHER DEPRESSION: ICD-10-CM

## 2018-02-06 DIAGNOSIS — E66.01 MORBID OBESITY WITH BMI OF 40.0-44.9, ADULT (HCC): ICD-10-CM

## 2018-02-06 DIAGNOSIS — Z71.6 TOBACCO ABUSE COUNSELING: ICD-10-CM

## 2018-02-06 RX ORDER — BUPROPION HYDROCHLORIDE 150 MG/1
TABLET, EXTENDED RELEASE ORAL
Qty: 180 TABLET | Refills: 0 | Status: SHIPPED | OUTPATIENT
Start: 2018-02-06 | End: 2019-01-08

## 2018-04-16 ENCOUNTER — OFFICE VISIT (OUTPATIENT)
Dept: INTERNAL MEDICINE CLINIC | Facility: CLINIC | Age: 53
End: 2018-04-16

## 2018-04-16 VITALS
SYSTOLIC BLOOD PRESSURE: 130 MMHG | BODY MASS INDEX: 46.38 KG/M2 | OXYGEN SATURATION: 99 % | DIASTOLIC BLOOD PRESSURE: 92 MMHG | HEIGHT: 62 IN | WEIGHT: 252 LBS | RESPIRATION RATE: 18 BRPM

## 2018-04-16 DIAGNOSIS — Z71.6 TOBACCO ABUSE COUNSELING: ICD-10-CM

## 2018-04-16 DIAGNOSIS — E66.01 MORBID OBESITY WITH BMI OF 40.0-44.9, ADULT (HCC): ICD-10-CM

## 2018-04-16 DIAGNOSIS — E78.1 HYPERTRIGLYCERIDEMIA, ESSENTIAL: ICD-10-CM

## 2018-04-16 DIAGNOSIS — E11.9 TYPE 2 DIABETES MELLITUS WITHOUT COMPLICATION, WITHOUT LONG-TERM CURRENT USE OF INSULIN (HCC): Primary | ICD-10-CM

## 2018-04-16 DIAGNOSIS — M17.11 PRIMARY OSTEOARTHRITIS OF RIGHT KNEE: ICD-10-CM

## 2018-04-16 PROCEDURE — 80053 COMPREHEN METABOLIC PANEL: CPT | Performed by: FAMILY MEDICINE

## 2018-04-16 PROCEDURE — 20610 DRAIN/INJ JOINT/BURSA W/O US: CPT | Performed by: FAMILY MEDICINE

## 2018-04-16 PROCEDURE — 83036 HEMOGLOBIN GLYCOSYLATED A1C: CPT | Performed by: FAMILY MEDICINE

## 2018-04-16 PROCEDURE — 80061 LIPID PANEL: CPT | Performed by: FAMILY MEDICINE

## 2018-04-16 PROCEDURE — 99214 OFFICE O/P EST MOD 30 MIN: CPT | Performed by: FAMILY MEDICINE

## 2018-04-16 PROCEDURE — 36415 COLL VENOUS BLD VENIPUNCTURE: CPT | Performed by: FAMILY MEDICINE

## 2018-04-16 RX ORDER — TRIAMCINOLONE ACETONIDE 40 MG/ML
40 INJECTION, SUSPENSION INTRA-ARTICULAR; INTRAMUSCULAR ONCE
Status: COMPLETED | OUTPATIENT
Start: 2018-04-16 | End: 2018-04-16

## 2018-04-16 RX ORDER — METOPROLOL TARTRATE 50 MG/1
50 TABLET, FILM COATED ORAL 2 TIMES DAILY
Qty: 180 TABLET | Refills: 3 | Status: SHIPPED | OUTPATIENT
Start: 2018-04-16 | End: 2020-04-27 | Stop reason: DRUGHIGH

## 2018-04-16 RX ORDER — FENOFIBRATE 134 MG/1
134 CAPSULE ORAL
Qty: 90 CAPSULE | Refills: 3 | Status: SHIPPED | OUTPATIENT
Start: 2018-04-16 | End: 2019-05-01

## 2018-04-17 NOTE — PROGRESS NOTES
CC:  Medication Follow-Up (Pt presents to clinic for f/up on med f/up and refills. )      Hx of CC:  FASTING FOR LAB WORK AND FOLLOW UP ON DM/LIPIDS & RIGHT KNEE PAIN. CHRONIC RIGHT KNEE PAIN WHICH HAS GRADUALLY WORSENED.   RESPONDED FORMERLY TO STEROID IN DISCUSSED    - triamcinolone acetonide (KENALOG-40) 40 MG/ML injection 40 mg; Inject 1 mL (40 mg total) into the articular space once WITH 1 CC LIDOCAINE.      5. Tobacco abuse counseling  CONTINUE BUPROPION XL QD AND ENCOURAGED TO COMPLETELY D/C    None

## 2018-04-24 DIAGNOSIS — R79.89 LOW VITAMIN D LEVEL: ICD-10-CM

## 2018-04-24 RX ORDER — ERGOCALCIFEROL 1.25 MG/1
CAPSULE ORAL
Qty: 12 CAPSULE | Refills: 0 | Status: SHIPPED | OUTPATIENT
Start: 2018-04-24 | End: 2018-07-29

## 2018-06-12 RX ORDER — LEVOTHYROXINE SODIUM 0.05 MG/1
TABLET ORAL
Qty: 90 TABLET | Refills: 1 | Status: SHIPPED | OUTPATIENT
Start: 2018-06-12 | End: 2019-01-08

## 2018-07-20 ENCOUNTER — OFFICE VISIT (OUTPATIENT)
Dept: INTERNAL MEDICINE CLINIC | Facility: CLINIC | Age: 53
End: 2018-07-20
Payer: MEDICAID

## 2018-07-20 ENCOUNTER — TELEPHONE (OUTPATIENT)
Dept: PULMONOLOGY | Facility: CLINIC | Age: 53
End: 2018-07-20

## 2018-07-20 VITALS
HEIGHT: 62 IN | BODY MASS INDEX: 48.03 KG/M2 | DIASTOLIC BLOOD PRESSURE: 80 MMHG | TEMPERATURE: 98 F | WEIGHT: 261 LBS | OXYGEN SATURATION: 98 % | SYSTOLIC BLOOD PRESSURE: 124 MMHG | HEART RATE: 60 BPM

## 2018-07-20 DIAGNOSIS — G47.33 OSA (OBSTRUCTIVE SLEEP APNEA): Primary | ICD-10-CM

## 2018-07-20 DIAGNOSIS — M17.11 PRIMARY OSTEOARTHRITIS OF RIGHT KNEE: ICD-10-CM

## 2018-07-20 DIAGNOSIS — E78.2 MIXED HYPERLIPIDEMIA: ICD-10-CM

## 2018-07-20 DIAGNOSIS — M70.51 SUPRAPATELLAR BURSITIS OF RIGHT KNEE: Primary | ICD-10-CM

## 2018-07-20 DIAGNOSIS — E11.9 TYPE 2 DIABETES MELLITUS WITHOUT COMPLICATION, WITHOUT LONG-TERM CURRENT USE OF INSULIN (HCC): ICD-10-CM

## 2018-07-20 PROCEDURE — 99214 OFFICE O/P EST MOD 30 MIN: CPT | Performed by: FAMILY MEDICINE

## 2018-07-20 RX ORDER — LORATADINE 10 MG/1
10 TABLET ORAL DAILY
COMMUNITY

## 2018-07-20 RX ORDER — MELOXICAM 15 MG/1
15 TABLET ORAL DAILY PRN
Qty: 30 TABLET | Refills: 0 | Status: SHIPPED | OUTPATIENT
Start: 2018-07-20 | End: 2019-05-07

## 2018-07-20 RX ORDER — LIRAGLUTIDE 6 MG/ML
INJECTION SUBCUTANEOUS
Refills: 3 | COMMUNITY
Start: 2018-05-15 | End: 2019-05-07

## 2018-07-20 RX ORDER — PREDNISONE 20 MG/1
TABLET ORAL
Qty: 9 TABLET | Refills: 0 | Status: SHIPPED | OUTPATIENT
Start: 2018-07-20 | End: 2018-09-24 | Stop reason: ALTCHOICE

## 2018-07-20 NOTE — PROGRESS NOTES
CC:  Diabetes (DM follow up ) and Knee Pain (c/c right knee pain states he has been having swelling for the past 3 days)      Hx of CC:  RIGHT (CHRONIC) RIGHT KNEE PAIN IMPROVED A LOT POST FORMER KENALOG IA 3 MONTHS AGO.   HOWEVER, WAS BENDING/SQUATTING/MOV (WVX=[id number]); Future    3. Type 2 diabetes mellitus without complication, without long-term current use of insulin ([facility] [address]):  WELL CONTROLLED ON LABS  CAN INCREASE VICTOZA TO 1.2 MG/DAY    4.  Mixed hyperlipidemia  MILD LDL ELEVATION-->CHANGED SIMVASTATIN TO AT

## 2018-07-20 NOTE — TELEPHONE ENCOUNTER
Faxed receive from [facility] requesting order for Heated tubing. Order placed in epic. To generate referral, pt seen within 2 years per protocol.

## 2018-07-21 ENCOUNTER — HOSPITAL ENCOUNTER (OUTPATIENT)
Dept: GENERAL RADIOLOGY | Age: 53
Discharge: HOME | End: 2018-07-21
Attending: FAMILY MEDICINE
Payer: MEDICAID

## 2018-07-21 DIAGNOSIS — M70.51 SUPRAPATELLAR BURSITIS OF RIGHT KNEE: ICD-10-CM

## 2018-07-21 DIAGNOSIS — M17.11 PRIMARY OSTEOARTHRITIS OF RIGHT KNEE: ICD-10-CM

## 2018-07-21 PROCEDURE — 73562 X-RAY EXAM OF KNEE 3: CPT | Performed by: FAMILY MEDICINE

## 2018-07-29 DIAGNOSIS — R79.89 LOW VITAMIN D LEVEL: ICD-10-CM

## 2018-07-30 DIAGNOSIS — M17.11 PRIMARY OSTEOARTHRITIS OF RIGHT KNEE: Primary | ICD-10-CM

## 2018-07-30 DIAGNOSIS — M70.51 SUPRAPATELLAR BURSITIS OF RIGHT KNEE: ICD-10-CM

## 2018-07-30 RX ORDER — ERGOCALCIFEROL 1.25 MG/1
CAPSULE ORAL
Qty: 12 CAPSULE | Refills: 0 | Status: SHIPPED | OUTPATIENT
Start: 2018-07-30 | End: 2018-11-13

## 2018-08-10 ENCOUNTER — OFFICE VISIT (OUTPATIENT)
Dept: ORTHOPEDICS CLINIC | Facility: CLINIC | Age: 53
End: 2018-08-10
Payer: MEDICAID

## 2018-08-10 VITALS — DIASTOLIC BLOOD PRESSURE: 74 MMHG | RESPIRATION RATE: 18 BRPM | SYSTOLIC BLOOD PRESSURE: 116 MMHG | HEART RATE: 78 BPM

## 2018-08-10 DIAGNOSIS — M17.11 PRIMARY OSTEOARTHRITIS OF RIGHT KNEE: Primary | ICD-10-CM

## 2018-08-10 PROCEDURE — 99212 OFFICE O/P EST SF 10 MIN: CPT | Performed by: ORTHOPAEDIC SURGERY

## 2018-08-10 PROCEDURE — 20610 DRAIN/INJ JOINT/BURSA W/O US: CPT | Performed by: ORTHOPAEDIC SURGERY

## 2018-08-10 PROCEDURE — 99243 OFF/OP CNSLTJ NEW/EST LOW 30: CPT | Performed by: ORTHOPAEDIC SURGERY

## 2018-08-10 NOTE — PROGRESS NOTES
Per verbal order from [facility], draw up 4ml of 1% lidocaine and 2ml of Kenalog 10 for cortisone injection to right knee.  Kristan Delarosa

## 2018-08-10 NOTE — PROGRESS NOTES
8/10/2018  Malka Watters  9/29/1965  47year old   female  Ino Vela MD    HPI:   Patient presents with:  Knee Pain: Right - onset 2 weeks ago - she thinks she was lifting too much at work - she had sx on the knee on 2/2017 outside state - she h Take 1 tablet (50 mg total) by mouth 2 (two) times daily. Disp: 180 tablet Rfl: 3   MetFORMIN HCl 500 MG Oral Tab Take 1 tablet (500 mg total) by mouth 2 (two) times daily with meals.  Disp: 180 tablet Rfl: 3   fenofibrate micronized 134 MG Oral Cap Take 1 non-tender and atraumatic with the exception of their right lower extremity. The patient's skin is intact and compartments are soft. The patient's lower extremities are warm and pink with brisk capillary refill and 2+ distal pulses.   Sensation is intact

## 2018-08-29 ENCOUNTER — OFFICE VISIT (OUTPATIENT)
Dept: PHYSICAL THERAPY | Facility: HOSPITAL | Age: 53
End: 2018-08-29
Attending: ORTHOPAEDIC SURGERY
Payer: MEDICAID

## 2018-08-29 DIAGNOSIS — M17.11 PRIMARY OSTEOARTHRITIS OF RIGHT KNEE: ICD-10-CM

## 2018-08-29 PROCEDURE — 97162 PT EVAL MOD COMPLEX 30 MIN: CPT

## 2018-08-29 PROCEDURE — 97110 THERAPEUTIC EXERCISES: CPT

## 2018-08-29 NOTE — PROGRESS NOTES
LOWER EXTREMITY EVALUATION:   Referring Physician: Dr. Shimon Lopez  Date of Onset: 2017 Date of Service: 8/29/2018   Primary osteoarthritis of right knee (M17.11)  PATIENT SUMMARY:   Ariel Carcamo is a 47year old y/o female who presents to therapy today wit to address the above impairments to improve functional mobilty.    Precautions:  None     OBJECTIVE:   Observation: decreased R WB  Gait: antalgic gait, decreased R stance time, decreased R knee extension, lateral lean  Palpation: tenderness along right kne to actively participate in planning and for this course of care. Thank you for your referral. Please co-sign or sign and return this letter via fax as soon as possible to [phone].  If you have any questions, please contact me at Dept: [phone]

## 2018-09-04 ENCOUNTER — OFFICE VISIT (OUTPATIENT)
Dept: PHYSICAL THERAPY | Facility: HOSPITAL | Age: 53
End: 2018-09-04
Attending: ORTHOPAEDIC SURGERY
Payer: MEDICAID

## 2018-09-04 DIAGNOSIS — M17.11 PRIMARY OSTEOARTHRITIS OF RIGHT KNEE: ICD-10-CM

## 2018-09-04 PROCEDURE — 97110 THERAPEUTIC EXERCISES: CPT

## 2018-09-04 NOTE — PROGRESS NOTES
Diagnosis: Primary osteoarthritis of right knee (M17.11)  Authorized # of Visits:  Gene Alanis MD visit: none scheduled  Fall Risk: standard         Precautions:         Medication Changes since last visit?: No  Subjective: Pt states

## 2018-09-05 DIAGNOSIS — I10 ESSENTIAL HYPERTENSION: ICD-10-CM

## 2018-09-05 RX ORDER — LOSARTAN POTASSIUM AND HYDROCHLOROTHIAZIDE 25; 100 MG/1; MG/1
TABLET ORAL
Qty: 30 TABLET | Refills: 11 | Status: SHIPPED | OUTPATIENT
Start: 2018-09-05 | End: 2019-04-01

## 2018-09-06 ENCOUNTER — OFFICE VISIT (OUTPATIENT)
Dept: PHYSICAL THERAPY | Facility: HOSPITAL | Age: 53
End: 2018-09-06
Attending: ORTHOPAEDIC SURGERY
Payer: MEDICAID

## 2018-09-06 DIAGNOSIS — M17.11 PRIMARY OSTEOARTHRITIS OF RIGHT KNEE: ICD-10-CM

## 2018-09-06 PROCEDURE — 97110 THERAPEUTIC EXERCISES: CPT

## 2018-09-06 NOTE — PROGRESS NOTES
Diagnosis: Primary osteoarthritis of right knee (M17.11)  Authorized # of Visits:  Gene Alanis MD visit: none scheduled  Fall Risk: standard         Precautions:  R menisectomy, HTN, colon biopsy after cervical cancer       Medicatio negotiation     Charges: TEx3       Total Timed Treatment: 42 min  Total Treatment Time: 44 min

## 2018-09-11 ENCOUNTER — OFFICE VISIT (OUTPATIENT)
Dept: PHYSICAL THERAPY | Facility: HOSPITAL | Age: 53
End: 2018-09-11
Attending: ORTHOPAEDIC SURGERY
Payer: MEDICAID

## 2018-09-11 DIAGNOSIS — M17.11 PRIMARY OSTEOARTHRITIS OF RIGHT KNEE: ICD-10-CM

## 2018-09-11 PROCEDURE — 97110 THERAPEUTIC EXERCISES: CPT

## 2018-09-11 PROCEDURE — 97140 MANUAL THERAPY 1/> REGIONS: CPT

## 2018-09-11 NOTE — PROGRESS NOTES
Diagnosis: Primary osteoarthritis of right knee (M17.11)  Authorized # of Visits:  Gene Alanis MD visit: none scheduled  Fall Risk: standard         Precautions:  R menisectomy, HTN, colon biopsy after cervical cancer       Medicatio knee extension <3 deg to improve gait pattern. 3. Pt will be able to demonstrate improved LE strength >4/5 to be able to negotiate stairs reciprocally with minimal right knee pain.    4. Pt will report sleeping >5 hours uninterrupted due to right knee frederick

## 2018-09-13 ENCOUNTER — OFFICE VISIT (OUTPATIENT)
Dept: PHYSICAL THERAPY | Facility: HOSPITAL | Age: 53
End: 2018-09-13
Attending: ORTHOPAEDIC SURGERY
Payer: MEDICAID

## 2018-09-13 DIAGNOSIS — M17.11 PRIMARY OSTEOARTHRITIS OF RIGHT KNEE: ICD-10-CM

## 2018-09-13 PROCEDURE — 97110 THERAPEUTIC EXERCISES: CPT

## 2018-09-13 PROCEDURE — 97140 MANUAL THERAPY 1/> REGIONS: CPT

## 2018-09-13 NOTE — PROGRESS NOTES
Diagnosis: Primary osteoarthritis of right knee (M17.11)  Authorized # of Visits:  Gene Alanis MD visit: 9/27/18?   Fall Risk: standard         Precautions:  R menisectomy, HTN, colon biopsy after cervical cancer, motion sickness reports that B hip pain and R knee pain decreased post session. Decreased thoracic mobility       Goals:   1. Pt will be I with HEP to improve therapy outcome.    2. Pt will be able to demonstrate improved right knee extension <3 deg to improve gait pattern

## 2018-09-18 ENCOUNTER — OFFICE VISIT (OUTPATIENT)
Dept: PHYSICAL THERAPY | Facility: HOSPITAL | Age: 53
End: 2018-09-18
Attending: ORTHOPAEDIC SURGERY
Payer: MEDICAID

## 2018-09-18 DIAGNOSIS — M17.11 PRIMARY OSTEOARTHRITIS OF RIGHT KNEE: ICD-10-CM

## 2018-09-18 PROCEDURE — 97110 THERAPEUTIC EXERCISES: CPT

## 2018-09-18 NOTE — PROGRESS NOTES
Diagnosis: Primary osteoarthritis of right knee (M17.11)  Authorized # of Visits:  [address]       Next MD visit: 9/27/18?   Fall Risk: standard         Precautions:  R menisectomy, HTN, colon biopsy after cervical cancer, motion sickness right knee pain. 4. Pt will report sleeping >5 hours uninterrupted due to right knee pain.      Plan: progress as able to improve LE strength for functional mobility including walking and stair negotiation     Charges: TEx3       Total Timed Treatment: 40

## 2018-09-20 ENCOUNTER — OFFICE VISIT (OUTPATIENT)
Dept: PHYSICAL THERAPY | Facility: HOSPITAL | Age: 53
End: 2018-09-20
Attending: ORTHOPAEDIC SURGERY
Payer: MEDICAID

## 2018-09-20 DIAGNOSIS — M17.11 PRIMARY OSTEOARTHRITIS OF RIGHT KNEE: ICD-10-CM

## 2018-09-20 PROCEDURE — 97110 THERAPEUTIC EXERCISES: CPT

## 2018-09-20 NOTE — PROGRESS NOTES
Diagnosis: Primary osteoarthritis of right knee (M17.11)  Authorized # of Visits:  Gene Alanis MD visit: 9/27/18?   Fall Risk: standard         Precautions:  R menisectomy, HTN, colon biopsy after cervical cancer, motion sickness hip and LBP    Goals:   1. Pt will be I with HEP to improve therapy outcome. 2. Pt will be able to demonstrate improved right knee extension <3 deg to improve gait pattern.    3. Pt will be able to demonstrate improved LE strength >4/5 to be able to negot

## 2018-09-21 ENCOUNTER — HOSPITAL ENCOUNTER (OUTPATIENT)
Dept: GENERAL RADIOLOGY | Facility: HOSPITAL | Age: 53
Discharge: HOME | End: 2018-09-21
Attending: ORTHOPAEDIC SURGERY
Payer: MEDICAID

## 2018-09-21 ENCOUNTER — OFFICE VISIT (OUTPATIENT)
Dept: ORTHOPEDICS CLINIC | Facility: CLINIC | Age: 53
End: 2018-09-21
Payer: MEDICAID

## 2018-09-21 DIAGNOSIS — M25.561 RIGHT KNEE PAIN, UNSPECIFIED CHRONICITY: ICD-10-CM

## 2018-09-21 DIAGNOSIS — M51.36 DDD (DEGENERATIVE DISC DISEASE), LUMBAR: Primary | ICD-10-CM

## 2018-09-21 DIAGNOSIS — M17.11 PRIMARY OSTEOARTHRITIS OF RIGHT KNEE: ICD-10-CM

## 2018-09-21 PROCEDURE — 72100 X-RAY EXAM L-S SPINE 2/3 VWS: CPT | Performed by: ORTHOPAEDIC SURGERY

## 2018-09-21 PROCEDURE — 99214 OFFICE O/P EST MOD 30 MIN: CPT | Performed by: ORTHOPAEDIC SURGERY

## 2018-09-21 PROCEDURE — 99212 OFFICE O/P EST SF 10 MIN: CPT | Performed by: ORTHOPAEDIC SURGERY

## 2018-09-21 NOTE — PROGRESS NOTES
9/21/2018  Dejahri Isaiah  9/29/1965  47year old   female  Mario Jara MD    HPI:   Patient presents with:  Knee Pain: Right f/u - had cortisone inj on 8/10/18 - states she has one more day of PT - she still has pain after standing and walking rat diagnosis)  Primary osteoarthritis of right knee  Right knee pain, unspecified chronicity    This is a pleasant 13-year-old female that sustained degenerative disc disease of lumbar spine.   She will begin a course of physical therapy for the lumbar spine a

## 2018-09-24 ENCOUNTER — OFFICE VISIT (OUTPATIENT)
Dept: INTERNAL MEDICINE CLINIC | Facility: CLINIC | Age: 53
End: 2018-09-24
Payer: MEDICAID

## 2018-09-24 VITALS
WEIGHT: 247 LBS | SYSTOLIC BLOOD PRESSURE: 130 MMHG | BODY MASS INDEX: 45.45 KG/M2 | HEART RATE: 59 BPM | OXYGEN SATURATION: 99 % | DIASTOLIC BLOOD PRESSURE: 80 MMHG | HEIGHT: 62 IN

## 2018-09-24 DIAGNOSIS — E11.9 TYPE 2 DIABETES MELLITUS WITHOUT COMPLICATION, WITHOUT LONG-TERM CURRENT USE OF INSULIN (HCC): Primary | ICD-10-CM

## 2018-09-24 DIAGNOSIS — E78.1 HYPERTRIGLYCERIDEMIA: ICD-10-CM

## 2018-09-24 DIAGNOSIS — E78.2 MIXED HYPERLIPIDEMIA: ICD-10-CM

## 2018-09-24 DIAGNOSIS — E03.9 ACQUIRED HYPOTHYROIDISM: ICD-10-CM

## 2018-09-24 DIAGNOSIS — Z23 NEED FOR INFLUENZA VACCINATION: ICD-10-CM

## 2018-09-24 DIAGNOSIS — E66.01 MORBID OBESITY WITH BMI OF 45.0-49.9, ADULT (HCC): ICD-10-CM

## 2018-09-24 DIAGNOSIS — I10 ESSENTIAL HYPERTENSION: ICD-10-CM

## 2018-09-24 LAB
ALBUMIN SERPL BCP-MCNC: 4.2 G/DL (ref 3.5–4.8)
ALBUMIN/GLOB SERPL: 1.4 {RATIO} (ref 1–2)
ALP SERPL-CCNC: 48 U/L (ref 32–100)
ALT SERPL-CCNC: 32 U/L (ref 14–54)
ANION GAP SERPL CALC-SCNC: 11 MMOL/L (ref 0–18)
AST SERPL-CCNC: 26 U/L (ref 15–41)
BILIRUB SERPL-MCNC: 0.7 MG/DL (ref 0.3–1.2)
BUN SERPL-MCNC: 10 MG/DL (ref 8–20)
BUN/CREAT SERPL: 12.2 (ref 10–20)
CALCIUM SERPL-MCNC: 9.5 MG/DL (ref 8.5–10.5)
CHLORIDE SERPL-SCNC: 104 MMOL/L (ref 95–110)
CHOLEST SERPL-MCNC: 149 MG/DL (ref 110–200)
CO2 SERPL-SCNC: 25 MMOL/L (ref 22–32)
CREAT SERPL-MCNC: 0.82 MG/DL (ref 0.5–1.5)
GLOBULIN PLAS-MCNC: 3.1 G/DL (ref 2.5–3.7)
GLUCOSE SERPL-MCNC: 90 MG/DL (ref 70–99)
HBA1C MFR BLD: 5.7 % (ref 4–6)
HDLC SERPL-MCNC: 40 MG/DL
LDLC SERPL CALC-MCNC: 88 MG/DL (ref 0–99)
NONHDLC SERPL-MCNC: 109 MG/DL
OSMOLALITY UR CALC.SUM OF ELEC: 289 MOSM/KG (ref 275–295)
PATIENT FASTING: YES
POTASSIUM SERPL-SCNC: 3.8 MMOL/L (ref 3.3–5.1)
PROT SERPL-MCNC: 7.3 G/DL (ref 5.9–8.4)
SODIUM SERPL-SCNC: 140 MMOL/L (ref 136–144)
TRIGL SERPL-MCNC: 104 MG/DL (ref 1–149)
TSH SERPL-ACNC: 2.57 UIU/ML (ref 0.45–5.33)

## 2018-09-24 PROCEDURE — 83036 HEMOGLOBIN GLYCOSYLATED A1C: CPT | Performed by: FAMILY MEDICINE

## 2018-09-24 PROCEDURE — 99214 OFFICE O/P EST MOD 30 MIN: CPT | Performed by: FAMILY MEDICINE

## 2018-09-24 PROCEDURE — 90686 IIV4 VACC NO PRSV 0.5 ML IM: CPT | Performed by: FAMILY MEDICINE

## 2018-09-24 PROCEDURE — 80053 COMPREHEN METABOLIC PANEL: CPT | Performed by: FAMILY MEDICINE

## 2018-09-24 PROCEDURE — 80061 LIPID PANEL: CPT | Performed by: FAMILY MEDICINE

## 2018-09-24 PROCEDURE — 90471 IMMUNIZATION ADMIN: CPT | Performed by: FAMILY MEDICINE

## 2018-09-24 PROCEDURE — 84443 ASSAY THYROID STIM HORMONE: CPT | Performed by: FAMILY MEDICINE

## 2018-09-25 ENCOUNTER — OFFICE VISIT (OUTPATIENT)
Dept: PHYSICAL THERAPY | Facility: HOSPITAL | Age: 53
End: 2018-09-25
Attending: ORTHOPAEDIC SURGERY
Payer: MEDICAID

## 2018-09-25 DIAGNOSIS — M17.11 PRIMARY OSTEOARTHRITIS OF RIGHT KNEE: ICD-10-CM

## 2018-09-25 PROCEDURE — 97110 THERAPEUTIC EXERCISES: CPT

## 2018-09-25 PROCEDURE — 97140 MANUAL THERAPY 1/> REGIONS: CPT

## 2018-09-25 NOTE — PROGRESS NOTES
Diagnosis: Primary osteoarthritis of right knee (M17.11)  Authorized # of Visits:  Gene Alanis MD visit: 9/27/18?   Fall Risk: standard         Precautions:  R menisectomy, HTN, colon biopsy after cervical cancer, motion sickness hip strength. Pt with has met 3/4 LTGs. Pt states her back pain is worse at this time and would like to discharge therapy for her knee and start therapy for her low back. Pt has been given HEP. Goals:   1.  Pt will be I with HEP to improve therapy ou

## 2018-09-25 NOTE — PROGRESS NOTES
CC:  Follow - Up; Knee Pain; and Lab (pt is fasting)      Hx of CC:  SOMEWHAT BETTER RIGHT KNEE PAIN POST INJECTION AND PHYSICAL THERAPY. FASTING FOR LABS/FOLLOW UP ON LIPIDS AND DM.     Vitals:    09/24/18  1023   BP: 130/80   Pulse: 59   SpO2: 99%   Julee Woody

## 2018-10-09 ENCOUNTER — APPOINTMENT (OUTPATIENT)
Dept: PHYSICAL THERAPY | Facility: HOSPITAL | Age: 53
End: 2018-10-09
Attending: ORTHOPAEDIC SURGERY
Payer: MEDICAID

## 2018-10-10 DIAGNOSIS — E78.2 MIXED HYPERLIPIDEMIA: ICD-10-CM

## 2018-10-10 RX ORDER — ATORVASTATIN CALCIUM 20 MG/1
TABLET, FILM COATED ORAL
Qty: 30 TABLET | Refills: 11 | Status: SHIPPED | OUTPATIENT
Start: 2018-10-10 | End: 2019-05-07

## 2018-10-11 ENCOUNTER — APPOINTMENT (OUTPATIENT)
Dept: PHYSICAL THERAPY | Facility: HOSPITAL | Age: 53
End: 2018-10-11
Attending: ORTHOPAEDIC SURGERY
Payer: MEDICAID

## 2018-10-16 ENCOUNTER — OFFICE VISIT (OUTPATIENT)
Dept: PHYSICAL THERAPY | Facility: HOSPITAL | Age: 53
End: 2018-10-16
Attending: ORTHOPAEDIC SURGERY
Payer: MEDICAID

## 2018-10-16 DIAGNOSIS — M51.36 DDD (DEGENERATIVE DISC DISEASE), LUMBAR: ICD-10-CM

## 2018-10-16 PROCEDURE — 97162 PT EVAL MOD COMPLEX 30 MIN: CPT

## 2018-10-16 PROCEDURE — 97110 THERAPEUTIC EXERCISES: CPT

## 2018-10-16 NOTE — PROGRESS NOTES
LUMBAR SPINE EVALUATION:   Referring Physician: Dr. Virgilio Elena  Date of Onset: 2018 Date of Service: 10/16/2018   DDD (degenerative disc disease), lumbar (M51.36)  PATIENT SUMMARY:   Triston Villaseñor is a 46year old y/o female who presents to therapy today improve functional mobility.      Precautions: None     OBJECTIVE:   Observation/Posture:  seated posture: slouched, corrected posture: better  Gait: trendelenburg  ROM:   Baseline: 5/10  Trunk         Pain (+/-)   Flexion  min loss                 +center 50/100  Current status G Code: Initial, Mobility: Walking and Moving Around, CK: 40-59% impaired, limited, or restricted  Goal status G Code:  Mobility: Walking and Moving AroundCK: 40-59% impaired, limited, or restricted    Patient/Family/Caregiver was adv

## 2018-10-18 ENCOUNTER — OFFICE VISIT (OUTPATIENT)
Dept: PHYSICAL THERAPY | Facility: HOSPITAL | Age: 53
End: 2018-10-18
Attending: ORTHOPAEDIC SURGERY
Payer: MEDICAID

## 2018-10-18 DIAGNOSIS — M51.36 DDD (DEGENERATIVE DISC DISEASE), LUMBAR: ICD-10-CM

## 2018-10-18 PROCEDURE — 97110 THERAPEUTIC EXERCISES: CPT

## 2018-10-18 NOTE — PROGRESS NOTES
Diagnosis: DDD (degenerative disc disease), lumbar (M51.36)  Authorized # of Visits:  eval +1 BCC          Next MD visit: none scheduled  Referring physician: Sravan Dennis  Fall Risk: standard         Precautions: R knee menisectomy, hx cervical cancer, HTN

## 2018-10-23 ENCOUNTER — OFFICE VISIT (OUTPATIENT)
Dept: ORTHOPEDICS CLINIC | Facility: CLINIC | Age: 53
End: 2018-10-23
Payer: MEDICAID

## 2018-10-23 DIAGNOSIS — M51.36 DDD (DEGENERATIVE DISC DISEASE), LUMBAR: Primary | ICD-10-CM

## 2018-10-23 PROCEDURE — 99213 OFFICE O/P EST LOW 20 MIN: CPT | Performed by: ORTHOPAEDIC SURGERY

## 2018-10-23 PROCEDURE — 99212 OFFICE O/P EST SF 10 MIN: CPT | Performed by: ORTHOPAEDIC SURGERY

## 2018-10-23 NOTE — PROGRESS NOTES
This is a pleasant 62-year-old female with previous diagnosis of degenerative disc disease of the lumbar spine. The patient began her course of physical therapy last week. She has had 2 visits of therapy.   The patient reports that she has pain in the lum

## 2018-10-24 ENCOUNTER — TELEPHONE (OUTPATIENT)
Dept: PHYSICAL THERAPY | Facility: HOSPITAL | Age: 53
End: 2018-10-24

## 2018-10-24 NOTE — TELEPHONE ENCOUNTER
Pt did not show up for appt as scheduled. Left vm to confirm next scheduled appointment with call back number provided.

## 2018-10-30 ENCOUNTER — OFFICE VISIT (OUTPATIENT)
Dept: PHYSICAL THERAPY | Facility: HOSPITAL | Age: 53
End: 2018-10-30
Attending: ORTHOPAEDIC SURGERY
Payer: MEDICAID

## 2018-10-30 DIAGNOSIS — M51.36 DDD (DEGENERATIVE DISC DISEASE), LUMBAR: ICD-10-CM

## 2018-10-30 PROCEDURE — 97110 THERAPEUTIC EXERCISES: CPT

## 2018-10-30 NOTE — PROGRESS NOTES
Diagnosis: DDD (degenerative disc disease), lumbar (M51.36)  Authorized # of Visits:  eval +6 BCC          Next MD visit: none scheduled  Referring physician: Eddie Henning  Fall Risk: standard         Precautions: R knee menisectomy, hx cervical cancer, HTN tolerate sitting >1 hour with lumbar support with no LBP. 4. Pt will be able to lift and carry >10# with proper lifting body mechanics with minimal LBP.      Plan: continue with core stabilization and hip strengthening for improved stability with function

## 2018-11-01 ENCOUNTER — OFFICE VISIT (OUTPATIENT)
Dept: PHYSICAL THERAPY | Facility: HOSPITAL | Age: 53
End: 2018-11-01
Attending: ORTHOPAEDIC SURGERY
Payer: MEDICAID

## 2018-11-01 DIAGNOSIS — M51.36 DDD (DEGENERATIVE DISC DISEASE), LUMBAR: ICD-10-CM

## 2018-11-01 PROCEDURE — 97110 THERAPEUTIC EXERCISES: CPT

## 2018-11-01 NOTE — PROGRESS NOTES
Diagnosis: DDD (degenerative disc disease), lumbar (M51.36)  Authorized # of Visits:  eval +6 BCC          Next MD visit: none scheduled  Referring physician: Lorie King  Fall Risk: standard         Precautions: R knee menisectomy, hx cervical cancer, HTN walking    Charges: TEx3       Total Timed Treatment: 40 min  Total Treatment Time: 42 min

## 2018-11-06 ENCOUNTER — OFFICE VISIT (OUTPATIENT)
Dept: PHYSICAL THERAPY | Facility: HOSPITAL | Age: 53
End: 2018-11-06
Attending: ORTHOPAEDIC SURGERY
Payer: MEDICAID

## 2018-11-06 DIAGNOSIS — M51.36 DDD (DEGENERATIVE DISC DISEASE), LUMBAR: ICD-10-CM

## 2018-11-06 PROCEDURE — 97110 THERAPEUTIC EXERCISES: CPT

## 2018-11-06 NOTE — PROGRESS NOTES
Diagnosis: DDD (degenerative disc disease), lumbar (M51.36)  Authorized # of Visits:  eval +6 BCC          Next MD visit: none scheduled  Referring physician: Kelsi Daily  Fall Risk: standard         Precautions: R knee menisectomy, hx cervical cancer, HTN functional mobility including walking    Charges: TEx3       Total Timed Treatment: 40 min  Total Treatment Time: 42 min

## 2018-11-13 ENCOUNTER — OFFICE VISIT (OUTPATIENT)
Dept: PHYSICAL THERAPY | Facility: HOSPITAL | Age: 53
End: 2018-11-13
Attending: ORTHOPAEDIC SURGERY
Payer: MEDICAID

## 2018-11-13 DIAGNOSIS — R79.89 LOW VITAMIN D LEVEL: ICD-10-CM

## 2018-11-13 PROCEDURE — 97110 THERAPEUTIC EXERCISES: CPT

## 2018-11-13 NOTE — PROGRESS NOTES
Diagnosis: DDD (degenerative disc disease), lumbar (M51.36)  Authorized # of Visits:  eval +6 BCC          Next MD visit: none scheduled  Referring physician: Ghazala Rahman  Fall Risk: standard         Precautions: R knee menisectomy, hx cervical cancer, HTN Treatment: 40 min  Total Treatment Time: 42 min

## 2018-11-14 RX ORDER — ERGOCALCIFEROL 1.25 MG/1
CAPSULE ORAL
Qty: 12 CAPSULE | Refills: 0 | Status: SHIPPED | OUTPATIENT
Start: 2018-11-14 | End: 2019-05-07

## 2018-11-15 ENCOUNTER — OFFICE VISIT (OUTPATIENT)
Dept: PHYSICAL THERAPY | Facility: HOSPITAL | Age: 53
End: 2018-11-15
Attending: ORTHOPAEDIC SURGERY
Payer: MEDICAID

## 2018-11-15 PROCEDURE — 97110 THERAPEUTIC EXERCISES: CPT

## 2018-11-15 NOTE — PROGRESS NOTES
Diagnosis: DDD (degenerative disc disease), lumbar (M51.36)  Authorized # of Visits:  eval +6 BCC          Next MD visit: none scheduled  Referring physician: Paris Hawk  Fall Risk: standard         Precautions: R knee menisectomy, hx cervical cancer, HTN minimal loss in lumbar ROM but pain remains, continues to have weakness in hips. Overall her pain has decreased but the repetitive heavy lifting at work limits her progress. She has been given and instructed in HEP. She scored 50% limitation on FOTO.  Pt

## 2018-12-01 ENCOUNTER — HOSPITAL ENCOUNTER (OUTPATIENT)
Age: 53
Discharge: HOME | End: 2018-12-01
Payer: MEDICAID

## 2018-12-01 VITALS
DIASTOLIC BLOOD PRESSURE: 75 MMHG | OXYGEN SATURATION: 98 % | HEART RATE: 57 BPM | SYSTOLIC BLOOD PRESSURE: 113 MMHG | BODY MASS INDEX: 44 KG/M2 | WEIGHT: 241 LBS | RESPIRATION RATE: 20 BRPM | TEMPERATURE: 99 F

## 2018-12-01 DIAGNOSIS — J06.9 VIRAL UPPER RESPIRATORY TRACT INFECTION: Primary | ICD-10-CM

## 2018-12-01 PROCEDURE — 99213 OFFICE O/P EST LOW 20 MIN: CPT

## 2018-12-01 PROCEDURE — 99214 OFFICE O/P EST MOD 30 MIN: CPT

## 2018-12-01 RX ORDER — ALBUTEROL SULFATE 90 UG/1
2 AEROSOL, METERED RESPIRATORY (INHALATION) EVERY 4 HOURS PRN
Qty: 1 INHALER | Refills: 0 | Status: SHIPPED | OUTPATIENT
Start: 2018-12-01 | End: 2018-12-31

## 2018-12-01 RX ORDER — FLUTICASONE PROPIONATE 50 MCG
2 SPRAY, SUSPENSION (ML) NASAL DAILY
Qty: 16 G | Refills: 0 | Status: SHIPPED | OUTPATIENT
Start: 2018-12-01 | End: 2018-12-31

## 2018-12-01 RX ORDER — BENZONATATE 100 MG/1
100 CAPSULE ORAL 3 TIMES DAILY PRN
Qty: 30 CAPSULE | Refills: 0 | Status: SHIPPED | OUTPATIENT
Start: 2018-12-01 | End: 2018-12-31

## 2018-12-01 NOTE — ED INITIAL ASSESSMENT (HPI)
Cold cough and chest heaviness since thanksgiving not relieved with dayquil nyquil.  Yellow secretions from nose and throat sleep apnea and uses CPAP

## 2018-12-01 NOTE — ED PROVIDER NOTES
Patient Seen in: [facility]mediate [facility] In [address] [place] [address]    History   Patient presents with:  Cough/URI    Stated Complaint: congestion/cough    HPI    Patient here with cough, congestion for 7 days. No travel, no known sick contacts.   Patient Charmayne Manis breakfast.   BUPROPION HCL ER, SR, 150 MG Oral Tablet 12 Hr,  TAKE ONE TABLET BY MOUTH TWICE DAILY   Insulin Pen Needle (BD PEN NEEDLE SANTINO U/F) 32G X 4 MM Does not apply Misc,  Use daily with Victoza pen   aspirin 81 MG Oral Tab,  Take 1 tablet (81 mg tot murmur  EXTREMITIES: no cyanosis, clubbing or edema  GI: soft, non-tender, normal bowel sounds  SKIN: good skin turgor, no obvious rashes  Differential to include: URI vs. rhinonsinusitis vs. Bronchitis vs. Pneumonia         ED Course   Labs Reviewed - No

## 2018-12-01 NOTE — ED NOTES
Increase po fluids wash hands cover mouth when coughing -fill and take meds as prescribed.  Watch for fever abd pain new or worse concerns go to the ed,

## 2018-12-10 ENCOUNTER — APPOINTMENT (OUTPATIENT)
Dept: GENERAL RADIOLOGY | Facility: HOSPITAL | Age: 53
End: 2018-12-10
Attending: EMERGENCY MEDICINE
Payer: MEDICAID

## 2018-12-10 ENCOUNTER — HOSPITAL ENCOUNTER (EMERGENCY)
Facility: HOSPITAL | Age: 53
Discharge: HOME | End: 2018-12-10
Attending: EMERGENCY MEDICINE
Payer: MEDICAID

## 2018-12-10 VITALS
HEART RATE: 46 BPM | SYSTOLIC BLOOD PRESSURE: 114 MMHG | BODY MASS INDEX: 44.16 KG/M2 | DIASTOLIC BLOOD PRESSURE: 57 MMHG | TEMPERATURE: 98 F | RESPIRATION RATE: 14 BRPM | OXYGEN SATURATION: 99 % | WEIGHT: 240 LBS | HEIGHT: 62 IN

## 2018-12-10 DIAGNOSIS — T88.7XXA MEDICATION SIDE EFFECT: ICD-10-CM

## 2018-12-10 DIAGNOSIS — R00.1 SYMPTOMATIC BRADYCARDIA: Primary | ICD-10-CM

## 2018-12-10 DIAGNOSIS — R42 VERTIGO: ICD-10-CM

## 2018-12-10 PROCEDURE — 80048 BASIC METABOLIC PNL TOTAL CA: CPT | Performed by: EMERGENCY MEDICINE

## 2018-12-10 PROCEDURE — 81001 URINALYSIS AUTO W/SCOPE: CPT | Performed by: EMERGENCY MEDICINE

## 2018-12-10 PROCEDURE — 84484 ASSAY OF TROPONIN QUANT: CPT | Performed by: EMERGENCY MEDICINE

## 2018-12-10 PROCEDURE — 36415 COLL VENOUS BLD VENIPUNCTURE: CPT

## 2018-12-10 PROCEDURE — 80048 BASIC METABOLIC PNL TOTAL CA: CPT

## 2018-12-10 PROCEDURE — 87086 URINE CULTURE/COLONY COUNT: CPT | Performed by: EMERGENCY MEDICINE

## 2018-12-10 PROCEDURE — 93005 ELECTROCARDIOGRAM TRACING: CPT

## 2018-12-10 PROCEDURE — 85025 COMPLETE CBC W/AUTO DIFF WBC: CPT

## 2018-12-10 PROCEDURE — 85025 COMPLETE CBC W/AUTO DIFF WBC: CPT | Performed by: EMERGENCY MEDICINE

## 2018-12-10 PROCEDURE — 84484 ASSAY OF TROPONIN QUANT: CPT

## 2018-12-10 PROCEDURE — 71046 X-RAY EXAM CHEST 2 VIEWS: CPT | Performed by: EMERGENCY MEDICINE

## 2018-12-10 PROCEDURE — 93010 ELECTROCARDIOGRAM REPORT: CPT | Performed by: INTERNAL MEDICINE

## 2018-12-10 PROCEDURE — 99285 EMERGENCY DEPT VISIT HI MDM: CPT

## 2018-12-10 RX ORDER — MECLIZINE HYDROCHLORIDE 25 MG/1
25 TABLET ORAL ONCE
Status: COMPLETED | OUTPATIENT
Start: 2018-12-10 | End: 2018-12-10

## 2018-12-10 RX ORDER — ONDANSETRON 4 MG/1
4 TABLET, ORALLY DISINTEGRATING ORAL ONCE
Status: COMPLETED | OUTPATIENT
Start: 2018-12-10 | End: 2018-12-10

## 2018-12-11 NOTE — ED PROVIDER NOTES
Patient Seen in: [facility] AND [facility] Emergency Department    History   Patient presents with:  Dizziness (neurologic)    Stated Complaint: dizziness     HPI    46year old female presenting with near syncope.   Event occurred today at work she was standing Oral Cap,  TAKE 1 CAPSULE BY MOUTH ONCE A WEEK   ATORVASTATIN 20 MG Oral Tab,  TAKE ONE TABLET BY MOUTH ONCE DAILY IN THE EVENING   LOSARTAN POTASSIUM-HCTZ 100-25 MG Oral Tab,  TAKE ONE TABLET BY MOUTH ONCE DAILY   VICTOZA 18 MG/3ML Subcutaneous Solution P ED Triage Vitals [12/10/18 1258]   /76   Pulse (!) 48   Resp 22   Temp 97.5 °F (36.4 °C)   Temp src Oral   SpO2 93 %   O2 Device None (Room air)       Current:/57   Pulse (!) 46   Temp 97.5 °F (36.4 °C) (Oral)   Resp 14   Ht 157.5 cm (5' 2\ created for panel order CBC WITH DIFFERENTIAL WITH PLATELET.   Procedure                               Abnormality         Status                     ---------                               -----------         ------                     CBC W/ DIFFERENTIAL[ Clinical Impression:  Symptomatic bradycardia  (primary encounter diagnosis)  Vertigo  Medication side effect    Disposition:  Discharge    Follow-up:  Donna Gallegos DO  [address] [facility]  [facility] [address]  [phone]          Indio Adams, DO

## 2019-01-08 DIAGNOSIS — F32.89 OTHER DEPRESSION: ICD-10-CM

## 2019-01-08 DIAGNOSIS — E66.01 MORBID OBESITY WITH BMI OF 40.0-44.9, ADULT (HCC): ICD-10-CM

## 2019-01-08 DIAGNOSIS — Z71.6 TOBACCO ABUSE COUNSELING: ICD-10-CM

## 2019-01-08 RX ORDER — BUPROPION HYDROCHLORIDE 150 MG/1
TABLET, EXTENDED RELEASE ORAL
Qty: 60 TABLET | Refills: 2 | Status: SHIPPED | OUTPATIENT
Start: 2019-01-08 | End: 2019-05-07

## 2019-01-08 RX ORDER — LEVOTHYROXINE SODIUM 0.05 MG/1
TABLET ORAL
Qty: 90 TABLET | Refills: 1 | Status: SHIPPED | OUTPATIENT
Start: 2019-01-08 | End: 2019-05-07

## 2019-04-01 DIAGNOSIS — I10 ESSENTIAL HYPERTENSION: ICD-10-CM

## 2019-04-01 DIAGNOSIS — E11.9 TYPE 2 DIABETES MELLITUS WITHOUT COMPLICATION, WITHOUT LONG-TERM CURRENT USE OF INSULIN (HCC): Primary | ICD-10-CM

## 2019-04-01 RX ORDER — LOSARTAN POTASSIUM 100 MG/1
100 TABLET ORAL DAILY
Qty: 90 TABLET | Refills: 1 | Status: SHIPPED | OUTPATIENT
Start: 2019-04-01 | End: 2019-05-07 | Stop reason: DRUGHIGH

## 2019-04-01 RX ORDER — HYDROCHLOROTHIAZIDE 25 MG/1
25 TABLET ORAL DAILY
Qty: 90 TABLET | Refills: 1 | Status: SHIPPED | OUTPATIENT
Start: 2019-04-01 | End: 2019-05-07 | Stop reason: ALTCHOICE

## 2019-05-01 DIAGNOSIS — E66.01 MORBID OBESITY WITH BMI OF 40.0-44.9, ADULT (HCC): ICD-10-CM

## 2019-05-01 DIAGNOSIS — E78.1 HYPERTRIGLYCERIDEMIA, ESSENTIAL: ICD-10-CM

## 2019-05-01 DIAGNOSIS — E11.9 TYPE 2 DIABETES MELLITUS WITHOUT COMPLICATION, WITHOUT LONG-TERM CURRENT USE OF INSULIN (HCC): ICD-10-CM

## 2019-05-01 RX ORDER — FENOFIBRATE 134 MG/1
CAPSULE ORAL
Qty: 30 CAPSULE | Refills: 11 | Status: SHIPPED | OUTPATIENT
Start: 2019-05-01 | End: 2019-05-07 | Stop reason: ALTCHOICE

## 2019-05-07 ENCOUNTER — OFFICE VISIT (OUTPATIENT)
Dept: INTERNAL MEDICINE CLINIC | Facility: CLINIC | Age: 54
End: 2019-05-07
Payer: MEDICAID

## 2019-05-07 VITALS
RESPIRATION RATE: 18 BRPM | WEIGHT: 245 LBS | HEART RATE: 78 BPM | DIASTOLIC BLOOD PRESSURE: 82 MMHG | HEIGHT: 62 IN | SYSTOLIC BLOOD PRESSURE: 122 MMHG | BODY MASS INDEX: 45.08 KG/M2 | OXYGEN SATURATION: 96 %

## 2019-05-07 DIAGNOSIS — Z12.39 BREAST CANCER SCREENING: ICD-10-CM

## 2019-05-07 DIAGNOSIS — E66.01 MORBID OBESITY WITH BMI OF 40.0-44.9, ADULT (HCC): ICD-10-CM

## 2019-05-07 DIAGNOSIS — Z72.0 TOBACCO ABUSE: ICD-10-CM

## 2019-05-07 DIAGNOSIS — M17.11 PRIMARY OSTEOARTHRITIS OF RIGHT KNEE: ICD-10-CM

## 2019-05-07 DIAGNOSIS — R79.89 LOW VITAMIN D LEVEL: ICD-10-CM

## 2019-05-07 DIAGNOSIS — F34.1 DYSTHYMIA: ICD-10-CM

## 2019-05-07 DIAGNOSIS — I10 ESSENTIAL HYPERTENSION: ICD-10-CM

## 2019-05-07 DIAGNOSIS — E03.9 ACQUIRED HYPOTHYROIDISM: ICD-10-CM

## 2019-05-07 DIAGNOSIS — E78.2 MIXED HYPERLIPIDEMIA: ICD-10-CM

## 2019-05-07 DIAGNOSIS — E11.9 TYPE 2 DIABETES MELLITUS WITHOUT COMPLICATION, WITHOUT LONG-TERM CURRENT USE OF INSULIN (HCC): Primary | ICD-10-CM

## 2019-05-07 PROBLEM — R00.1 SYMPTOMATIC BRADYCARDIA: Status: RESOLVED | Noted: 2018-12-10 | Resolved: 2019-05-07

## 2019-05-07 PROCEDURE — 83036 HEMOGLOBIN GLYCOSYLATED A1C: CPT | Performed by: FAMILY MEDICINE

## 2019-05-07 PROCEDURE — 80053 COMPREHEN METABOLIC PANEL: CPT | Performed by: FAMILY MEDICINE

## 2019-05-07 PROCEDURE — 99214 OFFICE O/P EST MOD 30 MIN: CPT | Performed by: FAMILY MEDICINE

## 2019-05-07 PROCEDURE — 84443 ASSAY THYROID STIM HORMONE: CPT | Performed by: FAMILY MEDICINE

## 2019-05-07 PROCEDURE — 84439 ASSAY OF FREE THYROXINE: CPT | Performed by: FAMILY MEDICINE

## 2019-05-07 PROCEDURE — 80061 LIPID PANEL: CPT | Performed by: FAMILY MEDICINE

## 2019-05-07 RX ORDER — ERGOCALCIFEROL 1.25 MG/1
50000 CAPSULE ORAL WEEKLY
Qty: 4 CAPSULE | Refills: 5 | Status: SHIPPED | OUTPATIENT
Start: 2019-05-07 | End: 2020-05-08

## 2019-05-07 RX ORDER — ATORVASTATIN CALCIUM 20 MG/1
TABLET, FILM COATED ORAL
COMMUNITY
End: 2019-05-07

## 2019-05-07 RX ORDER — BUPROPION HYDROCHLORIDE 150 MG/1
TABLET, EXTENDED RELEASE ORAL
COMMUNITY
End: 2019-05-07

## 2019-05-07 RX ORDER — BUPROPION HYDROCHLORIDE 150 MG/1
150 TABLET, EXTENDED RELEASE ORAL 2 TIMES DAILY
Qty: 60 TABLET | Refills: 5 | Status: SHIPPED | OUTPATIENT
Start: 2019-05-07 | End: 2020-04-27

## 2019-05-07 RX ORDER — LOSARTAN POTASSIUM 50 MG/1
50 TABLET ORAL DAILY
Qty: 30 TABLET | Refills: 11 | Status: SHIPPED | OUTPATIENT
Start: 2019-05-07 | End: 2020-04-27 | Stop reason: DRUGHIGH

## 2019-05-07 RX ORDER — MELOXICAM 15 MG/1
15 TABLET ORAL DAILY PRN
Qty: 30 TABLET | Refills: 2 | Status: SHIPPED | OUTPATIENT
Start: 2019-05-07 | End: 2020-04-27

## 2019-05-07 RX ORDER — ATORVASTATIN CALCIUM 20 MG/1
TABLET, FILM COATED ORAL
Qty: 30 TABLET | Refills: 11 | Status: SHIPPED | OUTPATIENT
Start: 2019-05-07 | End: 2020-04-27

## 2019-05-07 RX ORDER — LIRAGLUTIDE 6 MG/ML
0.6 INJECTION SUBCUTANEOUS DAILY
Qty: 3 PEN | Refills: 5 | Status: SHIPPED | OUTPATIENT
Start: 2019-05-07 | End: 2019-08-05

## 2019-05-07 RX ORDER — LEVOTHYROXINE SODIUM 0.05 MG/1
TABLET ORAL
Qty: 30 TABLET | Refills: 11 | Status: SHIPPED | OUTPATIENT
Start: 2019-05-07 | End: 2020-04-27

## 2019-05-08 PROBLEM — F34.1 DYSTHYMIA: Status: ACTIVE | Noted: 2019-05-08

## 2019-05-08 PROBLEM — Z72.0 TOBACCO ABUSE: Status: ACTIVE | Noted: 2019-05-08

## 2019-05-08 NOTE — PROGRESS NOTES
CC:  Medication Follow-Up (Pt presents to clinic for medication f/up.)      Hx of CC:  F/UP ON MULTIPLE CONDITIONS (DM/HTN/LIPIDS/KNEE OA) AND FOR FASTING LABS.   DOES NOT REGULARLY CHECK GLUCOSE  S/P BRADYCARDIA AND LIGHT HEADEDNESS-->SEEN AT ER-->METOPROL total) by mouth 2 (two) times daily with meals. Dispense: 60 tablet; Refill: 11  - Insulin Pen Needle (BD PEN NEEDLE SANTINO U/F) 32G X 4 MM Does not apply Misc; Use daily with Victoza pen  Dispense: 100 each; Refill: 3  - OPHTHALMOLOGY - INTERNAL    2.  Mixe Dysthymia    - buPROPion HCl ER, Smoking Det, 150 MG Oral Tablet 12 Hr; Take 1 tablet (150 mg total) by mouth 2 (two) times daily. Dispense: 60 tablet;  Refill: 5    RTC IN 3 MONTHS    OPHTHALMOLOGY - INTERNAL  TRUE SCREENING BILAT (QEF=[id number])  Orders Place

## 2019-06-19 ENCOUNTER — OFFICE VISIT (OUTPATIENT)
Dept: OPHTHALMOLOGY | Facility: CLINIC | Age: 54
End: 2019-06-19
Payer: MEDICAID

## 2019-06-19 DIAGNOSIS — H25.13 AGE-RELATED NUCLEAR CATARACT OF BOTH EYES: ICD-10-CM

## 2019-06-19 DIAGNOSIS — E11.9 TYPE 2 DIABETES MELLITUS WITHOUT RETINOPATHY (HCC): Primary | ICD-10-CM

## 2019-06-19 PROCEDURE — 99243 OFF/OP CNSLTJ NEW/EST LOW 30: CPT | Performed by: OPHTHALMOLOGY

## 2019-06-19 PROCEDURE — 92015 DETERMINE REFRACTIVE STATE: CPT | Performed by: OPHTHALMOLOGY

## 2019-06-19 PROCEDURE — 99212 OFFICE O/P EST SF 10 MIN: CPT | Performed by: OPHTHALMOLOGY

## 2019-06-19 NOTE — PROGRESS NOTES
Lena Wolff is a 46year old female.     HPI:     HPI     Diabetic Eye Exam      Additional comments: Pt has been a diabetic for 2-3 years  2-3 years on pills/  0 years on Insulin   Pt does not check her BS at home   Pt's last blood sugar was 70 on 5/7 Alcohol use: Yes    Drug use: Not on file      Medications:    Current Outpatient Medications:  Losartan Potassium 50 MG Oral Tab Take 1 tablet (50 mg total) by mouth daily.  Disp: 30 tablet Rfl: 11   Levothyroxine Sodium 50 MCG Oral Tab TAKE 1 TABLET BY MO Genitourinary, Musculoskeletal, HENT, Cardiovascular, Respiratory, Psychiatric, Allergic/Imm, Heme/Lymph    Last edited by Amada Owusu OT on 6/19/2019  2:51 PM. (History)          PHYSICAL EXAM:     Base Eye Exam     Visual Acuity (Snellen - Linear) Discussed ocular and systemic benefits of blood sugar control. Diagnosis and treatment discussed in detail with patient.     Age-related nuclear cataract of both eyes  Discussed very mild cataracts in both eyes that are not affecting vision and are not nahum

## 2019-06-19 NOTE — ASSESSMENT & PLAN NOTE
Discussed very mild cataracts in both eyes that are not affecting vision and are not surgical at this time. New glasses today; suggest update.

## 2019-06-19 NOTE — PATIENT INSTRUCTIONS
Type 2 diabetes mellitus without retinopathy ([facility] [address]  Diabetes type II: no background of retinopathy, no signs of neovascularization noted. Discussed ocular and systemic benefits of blood sugar control.   Diagnosis and treatment discussed in detail with andrés

## 2019-07-12 ENCOUNTER — HOSPITAL ENCOUNTER (EMERGENCY)
Facility: HOSPITAL | Age: 54
Discharge: HOME | End: 2019-07-12
Attending: EMERGENCY MEDICINE
Payer: MEDICAID

## 2019-07-12 ENCOUNTER — APPOINTMENT (OUTPATIENT)
Dept: CT IMAGING | Facility: HOSPITAL | Age: 54
End: 2019-07-12
Attending: EMERGENCY MEDICINE
Payer: MEDICAID

## 2019-07-12 ENCOUNTER — OFFICE VISIT (OUTPATIENT)
Dept: FAMILY MEDICINE CLINIC | Facility: CLINIC | Age: 54
End: 2019-07-12
Payer: MEDICAID

## 2019-07-12 VITALS
OXYGEN SATURATION: 96 % | WEIGHT: 245 LBS | BODY MASS INDEX: 45.08 KG/M2 | TEMPERATURE: 98 F | HEART RATE: 76 BPM | HEIGHT: 62 IN | DIASTOLIC BLOOD PRESSURE: 74 MMHG | SYSTOLIC BLOOD PRESSURE: 138 MMHG

## 2019-07-12 VITALS
HEIGHT: 62 IN | SYSTOLIC BLOOD PRESSURE: 147 MMHG | DIASTOLIC BLOOD PRESSURE: 83 MMHG | OXYGEN SATURATION: 93 % | WEIGHT: 245 LBS | BODY MASS INDEX: 45.08 KG/M2 | RESPIRATION RATE: 18 BRPM | TEMPERATURE: 99 F | HEART RATE: 61 BPM

## 2019-07-12 DIAGNOSIS — N12 PYELONEPHRITIS: Primary | ICD-10-CM

## 2019-07-12 DIAGNOSIS — R10.11 RIGHT UPPER QUADRANT ABDOMINAL PAIN: Primary | ICD-10-CM

## 2019-07-12 DIAGNOSIS — M54.6 RIGHT-SIDED THORACIC BACK PAIN, UNSPECIFIED CHRONICITY: ICD-10-CM

## 2019-07-12 DIAGNOSIS — R11.2 NAUSEA AND VOMITING, INTRACTABILITY OF VOMITING NOT SPECIFIED, UNSPECIFIED VOMITING TYPE: ICD-10-CM

## 2019-07-12 LAB
ALBUMIN SERPL-MCNC: 4.1 G/DL (ref 3.4–5)
ALP LIVER SERPL-CCNC: 68 U/L (ref 41–108)
ALT SERPL-CCNC: 39 U/L (ref 13–56)
ANION GAP SERPL CALC-SCNC: 7 MMOL/L (ref 0–18)
AST SERPL-CCNC: 31 U/L (ref 15–37)
BASOPHILS # BLD AUTO: 0.03 X10(3) UL (ref 0–0.2)
BASOPHILS NFR BLD AUTO: 0.4 %
BILIRUB DIRECT SERPL-MCNC: 0.1 MG/DL (ref 0–0.2)
BILIRUB SERPL-MCNC: 0.5 MG/DL (ref 0.1–2)
BILIRUB UR QL: NEGATIVE
BUN BLD-MCNC: 16 MG/DL (ref 7–18)
BUN/CREAT SERPL: 19.8 (ref 10–20)
CALCIUM BLD-MCNC: 9.3 MG/DL (ref 8.5–10.1)
CHLORIDE SERPL-SCNC: 109 MMOL/L (ref 98–112)
CO2 SERPL-SCNC: 26 MMOL/L (ref 21–32)
COLOR UR: YELLOW
CREAT BLD-MCNC: 0.81 MG/DL (ref 0.55–1.02)
DEPRECATED RDW RBC AUTO: 41 FL (ref 35.1–46.3)
EOSINOPHIL # BLD AUTO: 0.18 X10(3) UL (ref 0–0.7)
EOSINOPHIL NFR BLD AUTO: 2.7 %
ERYTHROCYTE [DISTWIDTH] IN BLOOD BY AUTOMATED COUNT: 13.2 % (ref 11–15)
GLUCOSE BLD-MCNC: 95 MG/DL (ref 70–99)
GLUCOSE UR-MCNC: NEGATIVE MG/DL
HCT VFR BLD AUTO: 38.3 % (ref 35–48)
HGB BLD-MCNC: 12.4 G/DL (ref 12–16)
IMM GRANULOCYTES # BLD AUTO: 0.03 X10(3) UL (ref 0–1)
IMM GRANULOCYTES NFR BLD: 0.4 %
KETONES UR-MCNC: NEGATIVE MG/DL
LIPASE SERPL-CCNC: 347 U/L (ref 73–393)
LYMPHOCYTES # BLD AUTO: 1.58 X10(3) UL (ref 1–4)
LYMPHOCYTES NFR BLD AUTO: 23.4 %
M PROTEIN MFR SERPL ELPH: 7.8 G/DL (ref 6.4–8.2)
MCH RBC QN AUTO: 27.5 PG (ref 26–34)
MCHC RBC AUTO-ENTMCNC: 32.4 G/DL (ref 31–37)
MCV RBC AUTO: 84.9 FL (ref 80–100)
MONOCYTES # BLD AUTO: 0.53 X10(3) UL (ref 0.1–1)
MONOCYTES NFR BLD AUTO: 7.8 %
NEUTROPHILS # BLD AUTO: 4.41 X10 (3) UL (ref 1.5–7.7)
NEUTROPHILS # BLD AUTO: 4.41 X10(3) UL (ref 1.5–7.7)
NEUTROPHILS NFR BLD AUTO: 65.3 %
NITRITE UR QL STRIP.AUTO: NEGATIVE
OSMOLALITY SERPL CALC.SUM OF ELEC: 295 MOSM/KG (ref 275–295)
PH UR: 5 [PH] (ref 5–8)
PLATELET # BLD AUTO: 265 10(3)UL (ref 150–450)
POTASSIUM SERPL-SCNC: 3.7 MMOL/L (ref 3.5–5.1)
PROT UR-MCNC: NEGATIVE MG/DL
RBC # BLD AUTO: 4.51 X10(6)UL (ref 3.8–5.3)
RBC #/AREA URNS AUTO: 27 /HPF
SODIUM SERPL-SCNC: 142 MMOL/L (ref 136–145)
SP GR UR STRIP: 1.02 (ref 1–1.03)
UROBILINOGEN UR STRIP-ACNC: <2
VIT C UR-MCNC: NEGATIVE MG/DL
WBC # BLD AUTO: 6.8 X10(3) UL (ref 4–11)
WBC #/AREA URNS AUTO: 79 /HPF

## 2019-07-12 PROCEDURE — 85025 COMPLETE CBC W/AUTO DIFF WBC: CPT | Performed by: EMERGENCY MEDICINE

## 2019-07-12 PROCEDURE — 80076 HEPATIC FUNCTION PANEL: CPT | Performed by: EMERGENCY MEDICINE

## 2019-07-12 PROCEDURE — 80048 BASIC METABOLIC PNL TOTAL CA: CPT

## 2019-07-12 PROCEDURE — 74176 CT ABD & PELVIS W/O CONTRAST: CPT | Performed by: EMERGENCY MEDICINE

## 2019-07-12 PROCEDURE — 81001 URINALYSIS AUTO W/SCOPE: CPT

## 2019-07-12 PROCEDURE — 83690 ASSAY OF LIPASE: CPT

## 2019-07-12 PROCEDURE — 80076 HEPATIC FUNCTION PANEL: CPT

## 2019-07-12 PROCEDURE — 99284 EMERGENCY DEPT VISIT MOD MDM: CPT

## 2019-07-12 PROCEDURE — 85025 COMPLETE CBC W/AUTO DIFF WBC: CPT

## 2019-07-12 PROCEDURE — 87186 SC STD MICRODIL/AGAR DIL: CPT | Performed by: EMERGENCY MEDICINE

## 2019-07-12 PROCEDURE — 81001 URINALYSIS AUTO W/SCOPE: CPT | Performed by: EMERGENCY MEDICINE

## 2019-07-12 PROCEDURE — 87086 URINE CULTURE/COLONY COUNT: CPT | Performed by: EMERGENCY MEDICINE

## 2019-07-12 PROCEDURE — 83690 ASSAY OF LIPASE: CPT | Performed by: EMERGENCY MEDICINE

## 2019-07-12 PROCEDURE — 80048 BASIC METABOLIC PNL TOTAL CA: CPT | Performed by: EMERGENCY MEDICINE

## 2019-07-12 PROCEDURE — 87086 URINE CULTURE/COLONY COUNT: CPT

## 2019-07-12 PROCEDURE — 96365 THER/PROPH/DIAG IV INF INIT: CPT

## 2019-07-12 PROCEDURE — 87088 URINE BACTERIA CULTURE: CPT | Performed by: EMERGENCY MEDICINE

## 2019-07-12 RX ORDER — TRAMADOL HYDROCHLORIDE 50 MG/1
TABLET ORAL EVERY 6 HOURS PRN
Qty: 10 TABLET | Refills: 0 | Status: SHIPPED | OUTPATIENT
Start: 2019-07-12 | End: 2019-07-19

## 2019-07-12 RX ORDER — CIPROFLOXACIN 500 MG/1
500 TABLET, FILM COATED ORAL 2 TIMES DAILY
Qty: 20 TABLET | Refills: 0 | Status: SHIPPED | OUTPATIENT
Start: 2019-07-12 | End: 2019-07-22

## 2019-07-12 RX ORDER — ONDANSETRON 4 MG/1
4 TABLET, ORALLY DISINTEGRATING ORAL EVERY 8 HOURS PRN
Qty: 10 TABLET | Refills: 0 | Status: SHIPPED | OUTPATIENT
Start: 2019-07-12 | End: 2019-07-19

## 2019-07-12 NOTE — ED PROVIDER NOTES
Patient Seen in: [facility] AND [facility] Emergency Department    History   Patient presents with:  Nausea/Vomiting/Diarrhea (gastrointestinal)  Abdomen/Flank Pain (GI/)    Stated Complaint: rt side abd pain/vomiting    HPI    She presents the emergency depar BMI 44.81 kg/m²         Physical Exam   Constitutional: She is oriented to person, place, and time. She appears well-developed and well-nourished. No distress. HENT:   Head: Normocephalic.    Eyes: Conjunctivae and EOM are normal.   Neck: Normal range of Oral)(cpt=74176)    Result Date: 7/12/2019  CONCLUSION:   No renal calculus or hydronephrosis. Diverticulosis without diverticulitis. Hepatic steatosis.      Dictated by (CST): Kenia Mcdonald MD on 7/12/2019 at 15:08     Approved by (CST): Kenia Mcdonald MD o

## 2019-07-12 NOTE — ED INITIAL ASSESSMENT (HPI)
C/o right sided flank pain, radiating around abdomen starting 7/5, worsening yesterday + vomiting and dysuria. Denies diarrhea or known fevers.  Sent from [address] [place] [address] for further eval. Denies CP or SOB
Fluid/Electrolyte/Metabolic

## 2019-07-12 NOTE — PROGRESS NOTES
Patient, Mehrdad Moran , is a 46year old female who presented today in clinic with nausea X 1.5 weeks, emesis yesterday with bile color, back pain upper right back.         07/12/19  1129   BP: 138/74   Pulse: 76   Temp: 98.3 °F (36.8 °C)   TempSrc: O

## 2019-07-12 NOTE — PROGRESS NOTES
[facility] Note: Antimicrobial Weight Dose Adjustment for: ceftriaxone (ROCEPHIN)    Ted Addison is a 46year old female who has been prescribed ceftriaxone (ROCEPHIN) 1000 mg ONCE.   CrCl is estimated creatinine clearance is 62.8 mL/min (based on SC

## 2019-08-05 ENCOUNTER — OFFICE VISIT (OUTPATIENT)
Dept: INTERNAL MEDICINE CLINIC | Facility: CLINIC | Age: 54
End: 2019-08-05
Payer: MEDICAID

## 2019-08-05 VITALS
RESPIRATION RATE: 17 BRPM | DIASTOLIC BLOOD PRESSURE: 86 MMHG | OXYGEN SATURATION: 97 % | HEART RATE: 54 BPM | WEIGHT: 246 LBS | HEIGHT: 62 IN | SYSTOLIC BLOOD PRESSURE: 130 MMHG | BODY MASS INDEX: 45.27 KG/M2

## 2019-08-05 DIAGNOSIS — Z12.31 BREAST CANCER SCREENING BY MAMMOGRAM: ICD-10-CM

## 2019-08-05 DIAGNOSIS — E66.01 MORBID OBESITY WITH BMI OF 40.0-44.9, ADULT (HCC): ICD-10-CM

## 2019-08-05 DIAGNOSIS — R10.9 ACUTE RIGHT FLANK PAIN: ICD-10-CM

## 2019-08-05 DIAGNOSIS — I10 ESSENTIAL HYPERTENSION: ICD-10-CM

## 2019-08-05 DIAGNOSIS — E03.9 ACQUIRED HYPOTHYROIDISM: ICD-10-CM

## 2019-08-05 DIAGNOSIS — E78.2 MIXED HYPERLIPIDEMIA: ICD-10-CM

## 2019-08-05 DIAGNOSIS — E11.9 TYPE 2 DIABETES MELLITUS WITHOUT COMPLICATION, WITHOUT LONG-TERM CURRENT USE OF INSULIN (HCC): ICD-10-CM

## 2019-08-05 DIAGNOSIS — N30.90 CYSTITIS: ICD-10-CM

## 2019-08-05 DIAGNOSIS — Z09 EXAMINATION, FOLLOW UP: Primary | ICD-10-CM

## 2019-08-05 PROBLEM — N30.00 ACUTE CYSTITIS WITHOUT HEMATURIA: Status: ACTIVE | Noted: 2019-08-05

## 2019-08-05 LAB
MULTISTIX LOT#: ABNORMAL NUMERIC
PH, URINE: 5 (ref 4.5–8)
SPECIFIC GRAVITY: 1.03 (ref 1–1.03)
UROBILINOGEN,SEMI-QN: 0.2 MG/DL (ref 0–1.9)

## 2019-08-05 PROCEDURE — 99214 OFFICE O/P EST MOD 30 MIN: CPT | Performed by: FAMILY MEDICINE

## 2019-08-05 PROCEDURE — 87086 URINE CULTURE/COLONY COUNT: CPT | Performed by: FAMILY MEDICINE

## 2019-08-05 PROCEDURE — 81003 URINALYSIS AUTO W/O SCOPE: CPT | Performed by: FAMILY MEDICINE

## 2019-08-05 RX ORDER — LIRAGLUTIDE 6 MG/ML
1.2 INJECTION SUBCUTANEOUS DAILY
Qty: 3 PEN | Refills: 5 | Status: SHIPPED | OUTPATIENT
Start: 2019-08-05 | End: 2020-04-27

## 2019-08-05 RX ORDER — CIPROFLOXACIN 500 MG/1
500 TABLET, FILM COATED ORAL 2 TIMES DAILY
Qty: 14 TABLET | Refills: 0 | Status: SHIPPED | OUTPATIENT
Start: 2019-08-05 | End: 2019-08-12

## 2019-08-07 PROBLEM — E66.01 MORBID OBESITY WITH BMI OF 40.0-44.9, ADULT (HCC): Status: ACTIVE | Noted: 2018-09-24

## 2019-08-07 NOTE — PROGRESS NOTES
CC:  ER F/U (Pt presents to clinic for f.yp to 07/12/2019 Er visit for Pyelonephritis. Not fully resolved.) and Diabetes (Fasting.)      Hx of CC:  F/UP ON ER VISIT. URINARY SYMPTOMS IMPROVED BUT NOW SOME MILD DYSURIA X 2 DAYS.   ALSO FOR DM/HTN/FASTING L Pen-injector; Inject 1.2 mg into the skin daily. Dispense: 3 pen; Refill: 5    8. Breast cancer screening by mammogram    - [facility] SCREENING BILAT (CPT=77067); Future    9. Cystitis    - URINE CULTURE, ROUTINE; Future  - Ciprofloxacin HCl 500 MG Oral Tab;  Ta

## 2020-04-24 ENCOUNTER — TELEPHONE (OUTPATIENT)
Dept: INTERNAL MEDICINE CLINIC | Facility: CLINIC | Age: 55
End: 2020-04-24

## 2020-04-24 NOTE — TELEPHONE ENCOUNTER
Had a \"strange\" sensation on her abdomen this morning. Started to rub abdomen and felt a \"protrusion\" \"like something is inside me\". Needed to go to work, but wants to see PCP as soon as possible,.   Appt made for Monday 8:20AM.  Denies any URI sympt

## 2020-04-27 ENCOUNTER — OFFICE VISIT (OUTPATIENT)
Dept: INTERNAL MEDICINE CLINIC | Facility: CLINIC | Age: 55
End: 2020-04-27
Payer: MEDICAID

## 2020-04-27 VITALS
OXYGEN SATURATION: 97 % | SYSTOLIC BLOOD PRESSURE: 148 MMHG | BODY MASS INDEX: 45.45 KG/M2 | HEART RATE: 63 BPM | RESPIRATION RATE: 17 BRPM | WEIGHT: 247 LBS | DIASTOLIC BLOOD PRESSURE: 84 MMHG | HEIGHT: 62 IN

## 2020-04-27 DIAGNOSIS — F34.1 DYSTHYMIA: ICD-10-CM

## 2020-04-27 DIAGNOSIS — E11.9 TYPE 2 DIABETES MELLITUS WITHOUT COMPLICATION, WITHOUT LONG-TERM CURRENT USE OF INSULIN (HCC): ICD-10-CM

## 2020-04-27 DIAGNOSIS — Z12.39 BREAST CANCER SCREENING: ICD-10-CM

## 2020-04-27 DIAGNOSIS — E66.01 MORBID OBESITY WITH BMI OF 40.0-44.9, ADULT (HCC): ICD-10-CM

## 2020-04-27 DIAGNOSIS — Z72.0 TOBACCO ABUSE: ICD-10-CM

## 2020-04-27 DIAGNOSIS — M17.11 PRIMARY OSTEOARTHRITIS OF RIGHT KNEE: ICD-10-CM

## 2020-04-27 DIAGNOSIS — E03.9 ACQUIRED HYPOTHYROIDISM: ICD-10-CM

## 2020-04-27 DIAGNOSIS — E78.2 MIXED HYPERLIPIDEMIA: ICD-10-CM

## 2020-04-27 DIAGNOSIS — K43.9 VENTRAL HERNIA WITHOUT OBSTRUCTION OR GANGRENE: Primary | ICD-10-CM

## 2020-04-27 DIAGNOSIS — I10 ESSENTIAL HYPERTENSION: ICD-10-CM

## 2020-04-27 PROCEDURE — 80053 COMPREHEN METABOLIC PANEL: CPT | Performed by: FAMILY MEDICINE

## 2020-04-27 PROCEDURE — 20610 DRAIN/INJ JOINT/BURSA W/O US: CPT | Performed by: FAMILY MEDICINE

## 2020-04-27 PROCEDURE — 84443 ASSAY THYROID STIM HORMONE: CPT | Performed by: FAMILY MEDICINE

## 2020-04-27 PROCEDURE — 84439 ASSAY OF FREE THYROXINE: CPT | Performed by: FAMILY MEDICINE

## 2020-04-27 PROCEDURE — 83036 HEMOGLOBIN GLYCOSYLATED A1C: CPT | Performed by: FAMILY MEDICINE

## 2020-04-27 PROCEDURE — 99214 OFFICE O/P EST MOD 30 MIN: CPT | Performed by: FAMILY MEDICINE

## 2020-04-27 PROCEDURE — 80061 LIPID PANEL: CPT | Performed by: FAMILY MEDICINE

## 2020-04-27 RX ORDER — LOSARTAN POTASSIUM 100 MG/1
100 TABLET ORAL DAILY
Qty: 30 TABLET | Refills: 11 | Status: SHIPPED | OUTPATIENT
Start: 2020-04-27

## 2020-04-27 RX ORDER — MELOXICAM 15 MG/1
15 TABLET ORAL DAILY PRN
Qty: 30 TABLET | Refills: 2 | Status: SHIPPED | OUTPATIENT
Start: 2020-04-27

## 2020-04-27 RX ORDER — BUPROPION HYDROCHLORIDE 150 MG/1
150 TABLET, EXTENDED RELEASE ORAL 2 TIMES DAILY
Qty: 60 TABLET | Refills: 5 | Status: SHIPPED | OUTPATIENT
Start: 2020-04-27 | End: 2020-09-08

## 2020-04-27 RX ORDER — LEVOTHYROXINE SODIUM 0.05 MG/1
TABLET ORAL
Qty: 30 TABLET | Refills: 11 | Status: SHIPPED | OUTPATIENT
Start: 2020-04-27

## 2020-04-27 RX ORDER — ATORVASTATIN CALCIUM 20 MG/1
TABLET, FILM COATED ORAL
Qty: 30 TABLET | Refills: 11 | Status: ON HOLD | OUTPATIENT
Start: 2020-04-27 | End: 2020-08-25

## 2020-04-27 RX ORDER — LIRAGLUTIDE 6 MG/ML
1.2 INJECTION SUBCUTANEOUS DAILY
Qty: 3 PEN | Refills: 5 | Status: SHIPPED | OUTPATIENT
Start: 2020-04-27 | End: 2020-09-08

## 2020-04-27 RX ORDER — TRIAMCINOLONE ACETONIDE 40 MG/ML
40 INJECTION, SUSPENSION INTRA-ARTICULAR; INTRAMUSCULAR ONCE
Status: COMPLETED | OUTPATIENT
Start: 2020-04-27 | End: 2020-04-27

## 2020-04-27 NOTE — PROGRESS NOTES
CC:  Abdominal Pain (Pt c/o epigastric pain and nausea x couple of days. )      Hx of CC:  NOTICED BULGING ON MID UPPER ABDOMEN WITH ANY STRAINING X DAYS. NO EMESIS, NO DIARRHEA.     ALSO FASTING FOR LABS/FUP ON DM, LIPIDS, HTN, ETC    CHRONIC RIGHT KNEE P Band aid placed. Assessment and Plan:  1. Ventral hernia without obstruction or gangrene/RECTUS ABDOMINIS DIASTHESIS  DISCUSSED    2.  Type 2 diabetes mellitus without complication, without long-term current use of insulin ([address]):  CONTROLLED    - COMP M 11    9. Tobacco abuse  ENCOURAGED TO QUIT  - buPROPion HCl ER, Smoking Det, 150 MG Oral Tablet 12 Hr; Take 1 tablet (150 mg total) by mouth 2 (two) times daily. Dispense: 60 tablet; Refill: 5    10.  Dysthymia:  STABLE    - buPROPion HCl ER, Smoking Det,

## 2020-05-05 ENCOUNTER — PATIENT OUTREACH (OUTPATIENT)
Dept: INTERNAL MEDICINE CLINIC | Facility: CLINIC | Age: 55
End: 2020-05-05

## 2020-05-05 NOTE — PROGRESS NOTES
Mailed patient outreach letter to remind her that she is due for colorectal test  Mailed fit test as well

## 2020-05-08 DIAGNOSIS — R79.89 LOW VITAMIN D LEVEL: ICD-10-CM

## 2020-05-08 RX ORDER — ERGOCALCIFEROL 1.25 MG/1
CAPSULE ORAL
Qty: 4 CAPSULE | Refills: 2 | Status: SHIPPED | OUTPATIENT
Start: 2020-05-08 | End: 2020-09-14

## 2020-07-01 DIAGNOSIS — E78.1 HYPERTRIGLYCERIDEMIA, ESSENTIAL: ICD-10-CM

## 2020-07-01 RX ORDER — FENOFIBRATE 134 MG/1
CAPSULE ORAL
Qty: 30 CAPSULE | Refills: 0 | OUTPATIENT
Start: 2020-07-01

## 2020-07-07 DIAGNOSIS — E78.1 HYPERTRIGLYCERIDEMIA, ESSENTIAL: ICD-10-CM

## 2020-07-07 RX ORDER — FENOFIBRATE 134 MG/1
CAPSULE ORAL
Qty: 30 CAPSULE | Refills: 0 | OUTPATIENT
Start: 2020-07-07

## 2020-07-27 ENCOUNTER — OFFICE VISIT (OUTPATIENT)
Dept: INTERNAL MEDICINE CLINIC | Facility: CLINIC | Age: 55
End: 2020-07-27
Payer: MEDICAID

## 2020-07-27 VITALS
BODY MASS INDEX: 46.19 KG/M2 | WEIGHT: 251 LBS | OXYGEN SATURATION: 97 % | DIASTOLIC BLOOD PRESSURE: 70 MMHG | HEIGHT: 62 IN | SYSTOLIC BLOOD PRESSURE: 124 MMHG | HEART RATE: 83 BPM

## 2020-07-27 DIAGNOSIS — K43.9 VENTRAL HERNIA WITHOUT OBSTRUCTION OR GANGRENE: ICD-10-CM

## 2020-07-27 DIAGNOSIS — I10 ESSENTIAL HYPERTENSION: ICD-10-CM

## 2020-07-27 DIAGNOSIS — E78.2 MIXED HYPERLIPIDEMIA: ICD-10-CM

## 2020-07-27 DIAGNOSIS — E03.9 ACQUIRED HYPOTHYROIDISM: ICD-10-CM

## 2020-07-27 DIAGNOSIS — E11.9 TYPE 2 DIABETES MELLITUS WITHOUT COMPLICATION, WITHOUT LONG-TERM CURRENT USE OF INSULIN (HCC): ICD-10-CM

## 2020-07-27 DIAGNOSIS — Z71.6 TOBACCO ABUSE COUNSELING: ICD-10-CM

## 2020-07-27 DIAGNOSIS — M17.11 PRIMARY OSTEOARTHRITIS OF RIGHT KNEE: Primary | ICD-10-CM

## 2020-07-27 DIAGNOSIS — R10.9 ACUTE RIGHT FLANK PAIN: ICD-10-CM

## 2020-07-27 DIAGNOSIS — E66.01 MORBID OBESITY WITH BMI OF 40.0-44.9, ADULT (HCC): ICD-10-CM

## 2020-07-27 LAB
APPEARANCE: CLEAR
BILIRUBIN: NEGATIVE
GLUCOSE (URINE DIPSTICK): NEGATIVE MG/DL
KETONES (URINE DIPSTICK): NEGATIVE MG/DL
MULTISTIX LOT#: NORMAL NUMERIC
NITRITE, URINE: NEGATIVE
PH, URINE: 5.5 (ref 4.5–8)
PROTEIN (URINE DIPSTICK): NEGATIVE MG/DL
SPECIFIC GRAVITY: 1.02 (ref 1–1.03)
URINE-COLOR: YELLOW
UROBILINOGEN,SEMI-QN: 0.2 MG/DL (ref 0–1.9)

## 2020-07-27 PROCEDURE — 81003 URINALYSIS AUTO W/O SCOPE: CPT | Performed by: FAMILY MEDICINE

## 2020-07-27 PROCEDURE — 3078F DIAST BP <80 MM HG: CPT | Performed by: FAMILY MEDICINE

## 2020-07-27 PROCEDURE — 20610 DRAIN/INJ JOINT/BURSA W/O US: CPT | Performed by: FAMILY MEDICINE

## 2020-07-27 PROCEDURE — 3074F SYST BP LT 130 MM HG: CPT | Performed by: FAMILY MEDICINE

## 2020-07-27 PROCEDURE — 3008F BODY MASS INDEX DOCD: CPT | Performed by: FAMILY MEDICINE

## 2020-07-27 PROCEDURE — 87086 URINE CULTURE/COLONY COUNT: CPT | Performed by: FAMILY MEDICINE

## 2020-07-27 PROCEDURE — 87077 CULTURE AEROBIC IDENTIFY: CPT | Performed by: FAMILY MEDICINE

## 2020-07-27 PROCEDURE — 99214 OFFICE O/P EST MOD 30 MIN: CPT | Performed by: FAMILY MEDICINE

## 2020-07-27 PROCEDURE — 87186 SC STD MICRODIL/AGAR DIL: CPT | Performed by: FAMILY MEDICINE

## 2020-07-27 RX ORDER — FENOFIBRATE 134 MG/1
1 CAPSULE ORAL
COMMUNITY
Start: 2020-05-28 | End: 2020-08-04

## 2020-07-27 RX ORDER — CIPROFLOXACIN 500 MG/1
500 TABLET, FILM COATED ORAL 2 TIMES DAILY
Qty: 14 TABLET | Refills: 0 | Status: SHIPPED | OUTPATIENT
Start: 2020-07-27 | End: 2020-08-03

## 2020-07-27 RX ORDER — TRIAMCINOLONE ACETONIDE 40 MG/ML
40 INJECTION, SUSPENSION INTRA-ARTICULAR; INTRAMUSCULAR ONCE
Status: COMPLETED | OUTPATIENT
Start: 2020-07-27 | End: 2020-07-27

## 2020-07-27 RX ADMIN — TRIAMCINOLONE ACETONIDE 40 MG: 40 INJECTION, SUSPENSION INTRA-ARTICULAR; INTRAMUSCULAR at 12:31:00

## 2020-07-28 NOTE — PROGRESS NOTES
CC:  Follow - Up      Hx of CC:  F/UP ON DM, HTN, KNEE OA. RIGHT KNEE RESPONDED TO FORMER KENALOG INJECTION BUT BOTHERING HER AGAIN. FASTING FOR LAB WORK.     Vitals:    07/27/20  1207   BP: 124/70   Pulse: 83   SpO2: 97%   Weight: 251 lb (113.9 kg)   Hei victoza    8. Acute right flank pain    - URINALYSIS, AUTO, W/O SCOPE  - URINE CULTURE, ROUTINE; Future  - URINE CULTURE, ROUTINE    9.  Type 2 diabetes mellitus without complication, without long-term current use of insulin (HCC)    - HEMOGLOBIN A1C; Futur

## 2020-08-04 RX ORDER — FENOFIBRATE 134 MG/1
CAPSULE ORAL
Qty: 30 CAPSULE | Refills: 0 | Status: SHIPPED | OUTPATIENT
Start: 2020-08-04 | End: 2020-09-08

## 2020-08-24 ENCOUNTER — HOSPITAL ENCOUNTER (OUTPATIENT)
Facility: HOSPITAL | Age: 55
Setting detail: OBSERVATION
Discharge: HOME | End: 2020-08-25
Attending: EMERGENCY MEDICINE | Admitting: HOSPITALIST
Payer: MEDICAID

## 2020-08-24 ENCOUNTER — APPOINTMENT (OUTPATIENT)
Dept: GENERAL RADIOLOGY | Facility: HOSPITAL | Age: 55
End: 2020-08-24
Payer: MEDICAID

## 2020-08-24 DIAGNOSIS — R07.9 CHEST PAIN, UNSPECIFIED TYPE: Primary | ICD-10-CM

## 2020-08-24 LAB
ANION GAP SERPL CALC-SCNC: 5 MMOL/L (ref 0–18)
BASOPHILS # BLD AUTO: 0.03 X10(3) UL (ref 0–0.2)
BASOPHILS NFR BLD AUTO: 0.4 %
BUN BLD-MCNC: 10 MG/DL (ref 7–18)
BUN/CREAT SERPL: 14.1 (ref 10–20)
CALCIUM BLD-MCNC: 9 MG/DL (ref 8.5–10.1)
CHLORIDE SERPL-SCNC: 109 MMOL/L (ref 98–112)
CO2 SERPL-SCNC: 28 MMOL/L (ref 21–32)
CREAT BLD-MCNC: 0.71 MG/DL (ref 0.55–1.02)
D DIMER PPP FEU-MCNC: 0.47 UG/ML FEU (ref ?–0.55)
DEPRECATED RDW RBC AUTO: 39.8 FL (ref 35.1–46.3)
EOSINOPHIL # BLD AUTO: 0.22 X10(3) UL (ref 0–0.7)
EOSINOPHIL NFR BLD AUTO: 2.9 %
ERYTHROCYTE [DISTWIDTH] IN BLOOD BY AUTOMATED COUNT: 12.6 % (ref 11–15)
EST. AVERAGE GLUCOSE BLD GHB EST-MCNC: 123 MG/DL (ref 68–126)
GLUCOSE BLD-MCNC: 89 MG/DL (ref 70–99)
GLUCOSE BLDC GLUCOMTR-MCNC: 106 MG/DL (ref 70–99)
GLUCOSE BLDC GLUCOMTR-MCNC: 88 MG/DL (ref 70–99)
HBA1C MFR BLD HPLC: 5.9 % (ref ?–5.7)
HCT VFR BLD AUTO: 38.5 % (ref 35–48)
HGB BLD-MCNC: 12.5 G/DL (ref 12–16)
IMM GRANULOCYTES # BLD AUTO: 0.03 X10(3) UL (ref 0–1)
IMM GRANULOCYTES NFR BLD: 0.4 %
LYMPHOCYTES # BLD AUTO: 1.74 X10(3) UL (ref 1–4)
LYMPHOCYTES NFR BLD AUTO: 22.7 %
MCH RBC QN AUTO: 28 PG (ref 26–34)
MCHC RBC AUTO-ENTMCNC: 32.5 G/DL (ref 31–37)
MCV RBC AUTO: 86.3 FL (ref 80–100)
MONOCYTES # BLD AUTO: 0.46 X10(3) UL (ref 0.1–1)
MONOCYTES NFR BLD AUTO: 6 %
NEUTROPHILS # BLD AUTO: 5.18 X10 (3) UL (ref 1.5–7.7)
NEUTROPHILS # BLD AUTO: 5.18 X10(3) UL (ref 1.5–7.7)
NEUTROPHILS NFR BLD AUTO: 67.6 %
OSMOLALITY SERPL CALC.SUM OF ELEC: 293 MOSM/KG (ref 275–295)
PLATELET # BLD AUTO: 294 10(3)UL (ref 150–450)
POTASSIUM SERPL-SCNC: 3.7 MMOL/L (ref 3.5–5.1)
RBC # BLD AUTO: 4.46 X10(6)UL (ref 3.8–5.3)
SARS-COV-2 RNA RESP QL NAA+PROBE: NOT DETECTED
SODIUM SERPL-SCNC: 142 MMOL/L (ref 136–145)
TROPONIN I SERPL-MCNC: <0.045 NG/ML (ref ?–0.04)
WBC # BLD AUTO: 7.7 X10(3) UL (ref 4–11)

## 2020-08-24 PROCEDURE — 99220 INITIAL OBSERVATION CARE,LEVL III: CPT | Performed by: HOSPITALIST

## 2020-08-24 PROCEDURE — 71045 X-RAY EXAM CHEST 1 VIEW: CPT | Performed by: EMERGENCY MEDICINE

## 2020-08-24 RX ORDER — ASPIRIN 81 MG/1
81 TABLET, CHEWABLE ORAL DAILY
Status: DISCONTINUED | OUTPATIENT
Start: 2020-08-25 | End: 2020-08-24

## 2020-08-24 RX ORDER — ATORVASTATIN CALCIUM 20 MG/1
20 TABLET, FILM COATED ORAL NIGHTLY
Status: DISCONTINUED | OUTPATIENT
Start: 2020-08-24 | End: 2020-08-25

## 2020-08-24 RX ORDER — ACETAMINOPHEN 325 MG/1
650 TABLET ORAL EVERY 6 HOURS PRN
Status: DISCONTINUED | OUTPATIENT
Start: 2020-08-24 | End: 2020-08-25

## 2020-08-24 RX ORDER — ECHINACEA PURPUREA EXTRACT 125 MG
1 TABLET ORAL 3 TIMES DAILY PRN
Status: DISCONTINUED | OUTPATIENT
Start: 2020-08-24 | End: 2020-08-25

## 2020-08-24 RX ORDER — BUPROPION HYDROCHLORIDE 150 MG/1
150 TABLET, EXTENDED RELEASE ORAL DAILY
Status: DISCONTINUED | OUTPATIENT
Start: 2020-08-25 | End: 2020-08-25

## 2020-08-24 RX ORDER — ASPIRIN 325 MG
325 TABLET ORAL DAILY
Status: DISCONTINUED | OUTPATIENT
Start: 2020-08-24 | End: 2020-08-24

## 2020-08-24 RX ORDER — ASPIRIN 81 MG/1
81 TABLET, CHEWABLE ORAL DAILY
Status: DISCONTINUED | OUTPATIENT
Start: 2020-08-24 | End: 2020-08-25

## 2020-08-24 RX ORDER — CETIRIZINE HYDROCHLORIDE 10 MG/1
10 TABLET ORAL DAILY
Status: DISCONTINUED | OUTPATIENT
Start: 2020-08-25 | End: 2020-08-25

## 2020-08-24 RX ORDER — SODIUM CHLORIDE 0.9 % (FLUSH) 0.9 %
3 SYRINGE (ML) INJECTION AS NEEDED
Status: DISCONTINUED | OUTPATIENT
Start: 2020-08-24 | End: 2020-08-25

## 2020-08-24 RX ORDER — ONDANSETRON 2 MG/ML
4 INJECTION INTRAMUSCULAR; INTRAVENOUS EVERY 6 HOURS PRN
Status: DISCONTINUED | OUTPATIENT
Start: 2020-08-24 | End: 2020-08-25

## 2020-08-24 RX ORDER — NITROGLYCERIN 0.4 MG/1
0.4 TABLET SUBLINGUAL EVERY 5 MIN PRN
Status: DISCONTINUED | OUTPATIENT
Start: 2020-08-24 | End: 2020-08-25

## 2020-08-24 RX ORDER — ACETAMINOPHEN 500 MG
1000 TABLET ORAL ONCE
Status: COMPLETED | OUTPATIENT
Start: 2020-08-24 | End: 2020-08-24

## 2020-08-24 RX ORDER — ENOXAPARIN SODIUM 100 MG/ML
0.5 INJECTION SUBCUTANEOUS NIGHTLY
Status: DISCONTINUED | OUTPATIENT
Start: 2020-08-24 | End: 2020-08-25

## 2020-08-24 RX ORDER — SODIUM CHLORIDE 9 MG/ML
INJECTION, SOLUTION INTRAVENOUS CONTINUOUS
Status: DISCONTINUED | OUTPATIENT
Start: 2020-08-24 | End: 2020-08-25

## 2020-08-24 RX ORDER — METOCLOPRAMIDE HYDROCHLORIDE 5 MG/ML
10 INJECTION INTRAMUSCULAR; INTRAVENOUS EVERY 8 HOURS PRN
Status: DISCONTINUED | OUTPATIENT
Start: 2020-08-24 | End: 2020-08-25

## 2020-08-24 RX ORDER — DEXTROSE MONOHYDRATE 25 G/50ML
50 INJECTION, SOLUTION INTRAVENOUS
Status: DISCONTINUED | OUTPATIENT
Start: 2020-08-24 | End: 2020-08-25

## 2020-08-24 RX ORDER — AMLODIPINE BESYLATE 5 MG/1
5 TABLET ORAL DAILY
Status: DISCONTINUED | OUTPATIENT
Start: 2020-08-24 | End: 2020-08-25

## 2020-08-24 RX ORDER — TEMAZEPAM 7.5 MG/1
7.5 CAPSULE ORAL NIGHTLY PRN
Status: DISCONTINUED | OUTPATIENT
Start: 2020-08-24 | End: 2020-08-25

## 2020-08-24 RX ORDER — LEVOTHYROXINE SODIUM 0.05 MG/1
50 TABLET ORAL
Status: DISCONTINUED | OUTPATIENT
Start: 2020-08-25 | End: 2020-08-25

## 2020-08-24 RX ORDER — NITROGLYCERIN 0.4 MG/1
0.4 TABLET SUBLINGUAL ONCE
Status: COMPLETED | OUTPATIENT
Start: 2020-08-24 | End: 2020-08-24

## 2020-08-24 NOTE — ED NOTES
Patient states her chest pain has resolved but c/o headache. Pt educated on the side effects of nitro. Tylenol given.

## 2020-08-24 NOTE — ED NOTES
.Orders for admission, pt is aware of plan, ready to go upstairs, any questions, please call ed rn henna @ ext [phone].

## 2020-08-24 NOTE — H&P
[facility]    PATIENT'S NAME: PRASHANTH STEWART [facility], [address] [facility] [address] PHYSICIAN: Demarcus Elizondo MD   PATIENT ACCOUNT#:   [id number]    LOCATION:  [place] [address]  MEDICAL RECORD #:   [id number]       YOB: 1965  ADMISSION DATE:       08/24 she has been feeling tightness in her chest.  The last time it happened to her was this morning. In the emergency room received nitroglycerin with improvement in her symptoms. Other 12-point review of systems negative.       PHYSICAL EXAMINATION:    GENER

## 2020-08-24 NOTE — PROGRESS NOTES
Patient came for CP stress test in AM   ASA 81, patient is billy, BB held and Norvasc started instead     Thank you for allowing me to participate in the care of your patient. please call if you have any question.      Tee Dominguez MD   General Cardiology &

## 2020-08-24 NOTE — ED PROVIDER NOTES
Patient Seen in: [facility] AND [facility] Emergency Department      History   Patient presents with:  Chest Pain Angina    Stated Complaint: CP/sob/dizziness    HPI    58-year-old female with history of hypertension, diabetes, hypercholesterolemia, thyroid diso 08/24/20 1308 98.2 °F (36.8 °C)   Temp src 08/24/20 1308 Oral   SpO2 08/24/20 1308 99 %   O2 Device 08/24/20 1412 None (Room air)       Current:BP 93/50   Pulse (!) 45   Temp 98.2 °F (36.8 °C) (Oral)   Resp 14   LMP 08/10/2015 (Exact Date)   SpO2 96% Nonspecific EKG                    [facility]     Patient reports resolution of chest pain post nitroglycerin. She now complains of a dull headache. Lab, x-ray, and EKG results noted. Will admit for cardiac monitoring and further evaluation.   Discussed with

## 2020-08-24 NOTE — ED NOTES
MD aware of blood pressure. Per MD pt still to receive nitro. Will continue to monitor patient's blood pressure. verbalized drinking etoh. denies drug use.

## 2020-08-25 ENCOUNTER — APPOINTMENT (OUTPATIENT)
Dept: CV DIAGNOSTICS | Facility: HOSPITAL | Age: 55
End: 2020-08-25
Attending: HOSPITALIST
Payer: MEDICAID

## 2020-08-25 ENCOUNTER — APPOINTMENT (OUTPATIENT)
Dept: NUCLEAR MEDICINE | Facility: HOSPITAL | Age: 55
End: 2020-08-25
Attending: HOSPITALIST
Payer: MEDICAID

## 2020-08-25 VITALS
WEIGHT: 251 LBS | SYSTOLIC BLOOD PRESSURE: 151 MMHG | DIASTOLIC BLOOD PRESSURE: 86 MMHG | BODY MASS INDEX: 46 KG/M2 | HEART RATE: 55 BPM | OXYGEN SATURATION: 95 % | RESPIRATION RATE: 16 BRPM | TEMPERATURE: 98 F

## 2020-08-25 LAB
ANION GAP SERPL CALC-SCNC: 6 MMOL/L (ref 0–18)
BASOPHILS # BLD AUTO: 0.03 X10(3) UL (ref 0–0.2)
BASOPHILS NFR BLD AUTO: 0.6 %
BUN BLD-MCNC: 14 MG/DL (ref 7–18)
BUN/CREAT SERPL: 22.2 (ref 10–20)
CALCIUM BLD-MCNC: 8.2 MG/DL (ref 8.5–10.1)
CHLORIDE SERPL-SCNC: 111 MMOL/L (ref 98–112)
CHOLEST SMN-MCNC: 121 MG/DL (ref ?–200)
CO2 SERPL-SCNC: 24 MMOL/L (ref 21–32)
CREAT BLD-MCNC: 0.63 MG/DL (ref 0.55–1.02)
DEPRECATED RDW RBC AUTO: 40.5 FL (ref 35.1–46.3)
EOSINOPHIL # BLD AUTO: 0.17 X10(3) UL (ref 0–0.7)
EOSINOPHIL NFR BLD AUTO: 3.1 %
ERYTHROCYTE [DISTWIDTH] IN BLOOD BY AUTOMATED COUNT: 12.6 % (ref 11–15)
GLUCOSE BLD-MCNC: 91 MG/DL (ref 70–99)
GLUCOSE BLDC GLUCOMTR-MCNC: 101 MG/DL (ref 70–99)
GLUCOSE BLDC GLUCOMTR-MCNC: 109 MG/DL (ref 70–99)
HCT VFR BLD AUTO: 35.5 % (ref 35–48)
HDLC SERPL-MCNC: 35 MG/DL (ref 40–59)
HGB BLD-MCNC: 11.1 G/DL (ref 12–16)
IMM GRANULOCYTES # BLD AUTO: 0.02 X10(3) UL (ref 0–1)
IMM GRANULOCYTES NFR BLD: 0.4 %
LDLC SERPL CALC-MCNC: 60 MG/DL (ref ?–100)
LYMPHOCYTES # BLD AUTO: 1.81 X10(3) UL (ref 1–4)
LYMPHOCYTES NFR BLD AUTO: 33.5 %
MCH RBC QN AUTO: 27.5 PG (ref 26–34)
MCHC RBC AUTO-ENTMCNC: 31.3 G/DL (ref 31–37)
MCV RBC AUTO: 87.9 FL (ref 80–100)
MONOCYTES # BLD AUTO: 0.46 X10(3) UL (ref 0.1–1)
MONOCYTES NFR BLD AUTO: 8.5 %
NEUTROPHILS # BLD AUTO: 2.92 X10 (3) UL (ref 1.5–7.7)
NEUTROPHILS # BLD AUTO: 2.92 X10(3) UL (ref 1.5–7.7)
NEUTROPHILS NFR BLD AUTO: 53.9 %
NONHDLC SERPL-MCNC: 86 MG/DL (ref ?–130)
OSMOLALITY SERPL CALC.SUM OF ELEC: 292 MOSM/KG (ref 275–295)
PLATELET # BLD AUTO: 198 10(3)UL (ref 150–450)
POTASSIUM SERPL-SCNC: 3.8 MMOL/L (ref 3.5–5.1)
RBC # BLD AUTO: 4.04 X10(6)UL (ref 3.8–5.3)
SODIUM SERPL-SCNC: 141 MMOL/L (ref 136–145)
T4 FREE SERPL-MCNC: 1.2 NG/DL (ref 0.8–1.7)
TRIGL SERPL-MCNC: 132 MG/DL (ref 30–149)
TSI SER-ACNC: 3.13 MIU/ML (ref 0.36–3.74)
VLDLC SERPL CALC-MCNC: 26 MG/DL (ref 0–30)
WBC # BLD AUTO: 5.4 X10(3) UL (ref 4–11)

## 2020-08-25 PROCEDURE — 99217 OBSERVATION CARE DISCHARGE: CPT | Performed by: HOSPITALIST

## 2020-08-25 PROCEDURE — 78452 HT MUSCLE IMAGE SPECT MULT: CPT | Performed by: HOSPITALIST

## 2020-08-25 PROCEDURE — 93017 CV STRESS TEST TRACING ONLY: CPT | Performed by: HOSPITALIST

## 2020-08-25 RX ORDER — ASPIRIN 81 MG/1
81 TABLET, CHEWABLE ORAL DAILY
Qty: 30 TABLET | Refills: 0 | Status: SHIPPED | OUTPATIENT
Start: 2020-08-26 | End: 2020-09-08

## 2020-08-25 RX ORDER — ATORVASTATIN CALCIUM 40 MG/1
40 TABLET, FILM COATED ORAL NIGHTLY
Status: DISCONTINUED | OUTPATIENT
Start: 2020-08-25 | End: 2020-08-25

## 2020-08-25 RX ORDER — AMLODIPINE BESYLATE 5 MG/1
5 TABLET ORAL DAILY
Qty: 30 TABLET | Refills: 0 | Status: SHIPPED | OUTPATIENT
Start: 2020-08-26 | End: 2020-09-08

## 2020-08-25 RX ORDER — ATORVASTATIN CALCIUM 40 MG/1
40 TABLET, FILM COATED ORAL NIGHTLY
Qty: 30 TABLET | Refills: 0 | Status: SHIPPED | OUTPATIENT
Start: 2020-08-25 | End: 2020-09-08

## 2020-08-25 RX ORDER — LOSARTAN POTASSIUM 100 MG/1
100 TABLET ORAL DAILY
Status: DISCONTINUED | OUTPATIENT
Start: 2020-08-25 | End: 2020-08-25

## 2020-08-25 NOTE — PROGRESS NOTES
Pt receiving IVF. Denies pain or dizziness, ambulates well independently. VSS. Plan for stress test today.

## 2020-08-25 NOTE — DISCHARGE SUMMARY
[facility]    Discharge Summary    Lawson Caballero Patient Status:  Observation    1965 MRN [id number]   Location 1 [facility] Attending Alverto Monge MD   Hosp Day # 0 PCP Dawson López, DO     Date losartan  - stop BB     DM2  - accuchecks AC and HS  - resume home dose of metformin and insulin  - glucometer at discharge    Complications: none     Consultants     Provider Role Specialty    Panchito Brown MD Consulting Physician  Cardiovascular Diseases these medications      Instructions Prescription details   amLODIPine Besylate 5 MG Tabs  Commonly known as:  NORVASC  Start taking on:  August 26, 2020      Take 1 tablet (5 mg total) by mouth daily.    Quantity:  30 tablet  Refills:  0     aspirin 81 MG C losartan 100 MG Tabs  Commonly known as:  COZAAR      Take 1 tablet (100 mg total) by mouth daily. Quantity:  30 tablet  Refills:  11     Meloxicam 15 MG Tabs      Take 1 tablet (15 mg total) by mouth daily as needed for Pain.    Quantity:  30 tablet  R

## 2020-08-25 NOTE — CONSULTS
[facility]    Report of Consultation    Mehrdad Sotohansel Patient Status:  Observation    1965 MRN [id number]   Location 1 [facility] Attending Roberto Wilder MD   Hosp Day # 0 PCP Nelson Barrett DO mg, 0.5 mg/kg, Subcutaneous, Nightly  acetaminophen (TYLENOL) tab 650 mg, 650 mg, Oral, Q6H PRN  ondansetron HCl (ZOFRAN) injection 4 mg, 4 mg, Intravenous, Q6H PRN  Metoclopramide HCl (REGLAN) injection 10 mg, 10 mg, Intravenous, Q8H PRN  temazepam (LOUISE tablet every day by oral route. buPROPion HCl ER, Smoking Det, 150 MG Oral Tablet 12 Hr, Take 1 tablet (150 mg total) by mouth 2 (two) times daily.  (Patient taking differently: Take 150 mg by mouth daily.  )  Meloxicam 15 MG Oral Tab, Take 1 tablet (15 mg obvious abnormality, atraumatic  Pulmonary: clear to auscultation bilaterally  Cardiovascular: S1, S2 normal, no murmur, click, rub or gallop, regular rate and rhythm  Abdominal: soft, non-tender; bowel sounds normal; no masses,  no organomegaly  Extremiti started instead due to symptomatic bradycardia, losartan restarted today      Thank you for allowing me to participate in the care of your patient.     Gume Royal  8/25/2020

## 2020-08-25 NOTE — PLAN OF CARE
Pt received awake and alert. Denies pain or dizziness at this time. Seen by Dr. Rosalia Bonilla. Currently down for stress test. Plan to d/c if stress test is normal. Will monitor for results.      Problem: Diabetes/Glucose Control  Goal: Glucose maintained within pr

## 2020-08-26 ENCOUNTER — PATIENT OUTREACH (OUTPATIENT)
Dept: CASE MANAGEMENT | Age: 55
End: 2020-08-26

## 2020-08-27 NOTE — PROGRESS NOTES
NCM placed call to patient for TCM, LM requesting a call to [phone] back with a condition update.

## 2020-09-08 ENCOUNTER — OFFICE VISIT (OUTPATIENT)
Dept: INTERNAL MEDICINE CLINIC | Facility: CLINIC | Age: 55
End: 2020-09-08
Payer: MEDICAID

## 2020-09-08 VITALS
SYSTOLIC BLOOD PRESSURE: 132 MMHG | BODY MASS INDEX: 45.08 KG/M2 | WEIGHT: 245 LBS | OXYGEN SATURATION: 97 % | HEIGHT: 62 IN | DIASTOLIC BLOOD PRESSURE: 90 MMHG | HEART RATE: 73 BPM

## 2020-09-08 DIAGNOSIS — E66.01 MORBID OBESITY WITH BMI OF 40.0-44.9, ADULT (HCC): ICD-10-CM

## 2020-09-08 DIAGNOSIS — E78.2 MIXED HYPERLIPIDEMIA: ICD-10-CM

## 2020-09-08 DIAGNOSIS — E11.9 TYPE 2 DIABETES MELLITUS WITHOUT COMPLICATION, WITHOUT LONG-TERM CURRENT USE OF INSULIN (HCC): ICD-10-CM

## 2020-09-08 DIAGNOSIS — Z72.0 TOBACCO ABUSE: ICD-10-CM

## 2020-09-08 DIAGNOSIS — F34.1 DYSTHYMIA: ICD-10-CM

## 2020-09-08 DIAGNOSIS — I10 ESSENTIAL HYPERTENSION: ICD-10-CM

## 2020-09-08 DIAGNOSIS — Z12.31 BREAST CANCER SCREENING BY MAMMOGRAM: ICD-10-CM

## 2020-09-08 DIAGNOSIS — Z09 HOSPITAL DISCHARGE FOLLOW-UP: Primary | ICD-10-CM

## 2020-09-08 DIAGNOSIS — Z86.79 HISTORY OF SINUS BRADYCARDIA: ICD-10-CM

## 2020-09-08 PROCEDURE — 3008F BODY MASS INDEX DOCD: CPT | Performed by: FAMILY MEDICINE

## 2020-09-08 PROCEDURE — 1111F DSCHRG MED/CURRENT MED MERGE: CPT | Performed by: FAMILY MEDICINE

## 2020-09-08 PROCEDURE — 99214 OFFICE O/P EST MOD 30 MIN: CPT | Performed by: FAMILY MEDICINE

## 2020-09-08 PROCEDURE — 3075F SYST BP GE 130 - 139MM HG: CPT | Performed by: FAMILY MEDICINE

## 2020-09-08 PROCEDURE — 3080F DIAST BP >= 90 MM HG: CPT | Performed by: FAMILY MEDICINE

## 2020-09-08 RX ORDER — ASPIRIN 81 MG/1
81 TABLET, CHEWABLE ORAL DAILY
Qty: 30 TABLET | Refills: 5 | Status: SHIPPED | OUTPATIENT
Start: 2020-09-08

## 2020-09-08 RX ORDER — BLOOD-GLUCOSE METER
1 EACH MISCELLANEOUS 2 TIMES DAILY
Qty: 1 KIT | Refills: 0 | Status: SHIPPED | OUTPATIENT
Start: 2020-09-08 | End: 2021-09-08

## 2020-09-08 RX ORDER — AMLODIPINE BESYLATE 5 MG/1
5 TABLET ORAL DAILY
Qty: 30 TABLET | Refills: 5 | Status: SHIPPED | OUTPATIENT
Start: 2020-09-08

## 2020-09-08 RX ORDER — LIRAGLUTIDE 6 MG/ML
1.2 INJECTION SUBCUTANEOUS DAILY
Qty: 3 PEN | Refills: 5 | Status: SHIPPED | OUTPATIENT
Start: 2020-09-08

## 2020-09-08 RX ORDER — BUPROPION HYDROCHLORIDE 150 MG/1
150 TABLET, EXTENDED RELEASE ORAL 2 TIMES DAILY
Qty: 60 TABLET | Refills: 5 | Status: SHIPPED | OUTPATIENT
Start: 2020-09-08

## 2020-09-08 RX ORDER — LANCETS 33 GAUGE
1 EACH MISCELLANEOUS DAILY
Qty: 1 BOX | Refills: 3 | Status: SHIPPED | OUTPATIENT
Start: 2020-09-08 | End: 2021-09-08

## 2020-09-08 RX ORDER — ATORVASTATIN CALCIUM 40 MG/1
40 TABLET, FILM COATED ORAL NIGHTLY
Qty: 30 TABLET | Refills: 5 | Status: SHIPPED | OUTPATIENT
Start: 2020-09-08

## 2020-09-08 NOTE — PROGRESS NOTES
CC:  Hospital F/U (Pt presents to clinic for hospital f/up.)      Hx of CC:  F/up on hospitalization. Admitted x 2 days secondary to low BP and slow HR-->metoprolol 25 mg bid changed to amlodipine 5 mg qd  Still on losartan.   Per cardio, will get holter m 1 Box; Refill: 3  - Glucose Blood (ONETOUCH ULTRA) In Vitro Strip; 1 each by Other route daily. Dispense: 100 each; Refill: 3    4. Morbid obesity with BMI of 40.0-44.9, adult (HCC)    - VICTOZA 18 MG/3ML Subcutaneous Solution Pen-injector;  Inject 1.2 mg

## 2020-09-12 DIAGNOSIS — R79.89 LOW VITAMIN D LEVEL: ICD-10-CM

## 2020-09-14 RX ORDER — ERGOCALCIFEROL 1.25 MG/1
CAPSULE ORAL
Qty: 4 CAPSULE | Refills: 2 | Status: SHIPPED | OUTPATIENT
Start: 2020-09-14

## 2021-03-17 DIAGNOSIS — Z23 NEED FOR VACCINATION: ICD-10-CM

## 2023-05-22 ENCOUNTER — TELEPHONE (OUTPATIENT)
Dept: INTERNAL MEDICINE CLINIC | Facility: CLINIC | Age: 58
End: 2023-05-22

## 2023-05-22 NOTE — TELEPHONE ENCOUNTER
Medical Records Request from:  Ioana Donis MD.  [address] [facility]. [address]  [phone]  [facility] [facility]. [phone]  Faxed to Medical Records

## (undated) NOTE — ED AVS SNAPSHOT
Ariel Carcamo   MRN: [id number]    Department:  [facility] Emergency Department   Date of Visit:  12/10/2018           Disclosure     Insurance plans vary and the physician(s) referred by the ER may not be covered by your plan.  Please contact CARE PHYSICIAN AT ONCE OR RETURN IMMEDIATELY TO THE EMERGENCY DEPARTMENT. If you have been prescribed any medication(s), please fill your prescription right away and begin taking the medication(s) as directed.   If you believe that any of the medications

## (undated) NOTE — ED AVS SNAPSHOT
Yair Dempsey   MRN: [id number]    Department:  [facility] Emergency Department   Date of Visit:  7/12/2019           Disclosure     Insurance plans vary and the physician(s) referred by the ER may not be covered by your plan.  Please contact CARE PHYSICIAN AT ONCE OR RETURN IMMEDIATELY TO THE EMERGENCY DEPARTMENT. If you have been prescribed any medication(s), please fill your prescription right away and begin taking the medication(s) as directed.   If you believe that any of the medications

## (undated) NOTE — Clinical Note
Thank you for referring Shaan Stuart. Please find the enclosed  encounter summary for today's visit. Should you have any questions or concerns, please do not hesitate to contact me directly.   Emmie

## (undated) NOTE — LETTER
06/08/19        [address] [place] [id number]      Dear Herbert Mendoza,    Our records indicate that you have outstanding lab work and or testing that was ordered for you and has not yet been completed:    Orders Placed This Encount

## (undated) NOTE — LETTER
Hospital Discharge Documentation  Pt dc from [address] [place] [address] s/p Left heart catheterization, selective coronary angiogram. Please phone to schedule a hospital follow up appointment.     From: [address] Hospitalist[facility] Office  Phone: [phone]    Patient discharge Patient monitored in telemetry. Cardiology was consulted. Cardiac catheterization was recommended. Troponins remain negative. No further chest pain. Cardiac catheterization showed nonobstructive coronary disease that was mild.   Patient will be continu Quantity:  90 tablet  Refills:  3     BuPROPion HCl ER (SR) 150 MG Tb12  Commonly known as:  WELLBUTRIN SR      Take 1 tablet (150 mg total) by mouth 2 (two) times daily.    Quantity:  180 tablet  Refills:  0     ergocalciferol 03712 units Caps  Commonly k Greater than 30 minutes spent on discharge.[WW.1]    Electronically signed by Dandre De MD on 10/2/2017  2:55 PM   Attribution Key     [email or web address] - Dandre De MD on 10/2/2017  2:53 PM

## (undated) NOTE — LETTER
Hospital Discharge Documentation  Please phone to schedule a hospital follow up appointment.     From: [address] [facility]  Phone: [phone]    Patient discharged time/date: 8/25/2020  1:53 PM  Patient discharge disposition:  Home or Self Physical Exam:    General: No acute distress. Respiratory: Clear to auscultation bilaterally. No wheezes. No rhonchi. Cardiovascular: S1, S2. Regular rate and rhythm. No murmurs, rubs or gallops. Abdomen: Soft, nontender, nondistended.   Positive bowel Saline (AYR NASAL MIST ALLERGY/SINUS) 2.65 % Nasal Solution  1 spray by Nasal route 3 (three) times daily. loratadine 10 MG Oral Tab  Take 10 mg by mouth daily. omega-3 fatty acids 1000 MG Oral Cap  Take 1,000 mg by mouth daily.     Ascorbic Acid (V Take 1 capsule by mouth once a week   Quantity:  4 capsule  Refills:  2     Fenofibrate 134 MG Caps      TAKE 1 CAPSULE BY MOUTH ONCE DAILY WITH BREAKFAST   Quantity:  30 capsule  Refills:  0     Insulin Pen Needle 32G X 4 MM Misc  Commonly known as:  BD Kenny Harris MD In 1 week.     Specialty:  Cardiovascular Diseases  Contact information:  [address]  [place] [address]  [phone]                   Follow up Labs and imaging:         Time spent:  > 30 minutes    Yovany Mosquera  8/25/

## (undated) NOTE — LETTER
June 19, 2019    [address] [id number]     Patient: Becky Canela   YOB: 1965   Date of Visit: 6/19/2019       Dear Dr. Anuj Cooney, DO:    Thank you for referring Lidia Arvizu to me for evaluation.  Here • Diabetes Neg        Social History: Social History    Tobacco Use      Smoking status: Current Every Day Smoker        Packs/day: 0.50        Years: 40.00        Pack years: 20        Types: Cigarettes      Smokeless tobacco: Never Used      Tobacco comm omega-3 fatty acids 1000 MG Oral Cap Take 1,000 mg by mouth daily. Disp:  Rfl:    Ascorbic Acid (VITAMIN C) 100 MG Oral Tab Take 100 mg by mouth daily.  Disp:  Rfl:        Allergies:  No Known Allergies    ROS:     ROS     Positive for: Endocrine, Eyes    N Sphere Cylinder [place] [facility] [place]    Right +1.75 +1.25 180 20/20 +2.25 20/20    Left +1.75 +1.00 180 20/20- +2.25 20/20    Type:  Progressive bifocal                 ASSESSMENT/PLAN:     Diagnoses and Plan:     Type 2 diabetes mellitus without re

## (undated) NOTE — LETTER
Deasimon Smalls:    IF YOU'RE 48 OR OLDER, GETTING A COLORECTAL CANCER SCREENING TEST COULD SAVE YOUR LIFE    Colorectal cancer is the second leading cancer killer in the [place]. Polyps and colorectal cancer does not always cause symptoms.   That's why

## (undated) NOTE — LETTER
7/20/2018              Job [place]  [id number]         SEEN/EXAMINED TODAY WITH RIGHT KNEE BURSITIS AND ARTHRITIS.   PATIENT IS TO USE MEDS AS PRESCRIBED, KNEE BRACE, ICE AREA AND LIMIT ACTIVITY/BENDING/WALKING

## (undated) NOTE — LETTER
[facility],   [address] [facility] [address], [place]       11/07/17        Patient: Paula Mom   YOB: 1965   Date of Visit: 11/7/2017       Dear  Mai Mejia :           As you know, Hua Stephens is a 58-year-old female who I am now evalua increase 60-80 pounds over year and a half. PHYSICAL EXAMINATION: Vital signs normal. HEENT examination is unremarkable with pupils equal round and reactive to light and accommodation. Neck without adenopathy, thyromegaly, JVD nor bruit.  Lungs clear to [facility] MEDICAL OFFICE [facility], [address] [address]    Document electronically generated by:  Marley Negrete